# Patient Record
Sex: FEMALE | Race: WHITE | NOT HISPANIC OR LATINO | Employment: UNEMPLOYED | URBAN - METROPOLITAN AREA
[De-identification: names, ages, dates, MRNs, and addresses within clinical notes are randomized per-mention and may not be internally consistent; named-entity substitution may affect disease eponyms.]

---

## 2020-12-29 ENCOUNTER — OFFICE VISIT CONVERTED (OUTPATIENT)
Dept: GASTROENTEROLOGY | Facility: CLINIC | Age: 35
End: 2020-12-29
Attending: INTERNAL MEDICINE

## 2021-05-10 NOTE — H&P
History and Physical      Patient Name: Aye Glez   Patient ID: [id number]   Sex: Female   YOB: 1985        Visit Date: December 29, 2020    Provider: Torito Law MD   [facility]   Location Address: [address] [place] [address]  [id number]   Location Phone: [phone]          Chief Complaint  · Crohn's disease      History Of Present Illness  The patient is a 35 year old female, with a history of Crohn's disease referred by Marcus Garcia who comes in today for a new patient evaluation . She presents with abdominal pain. The pain is located diffusely in the abdomen. It is moderate in severity and sharp in nature. The pain has been present 1 year and is intermittent.   Crohn's History:    The patient was diagnosised with Crohn's disease approximately 3 months. At that time, the patient presented with a stricture. The stricture involved the terminal ileum. She was found to have Crohn's disease involving the terminal ileum. The patient's initial treatment included surgery. She underwent bowel resection and colon resection on 11/9/2020.   The patient has done well since her initial diagnosis.          The patient presented in early November to [facility] with acute abdominal pain and was found to have a mass of the ileum.  It appears that she went for exploratory laparotomy on November 9 with Dr. Garcia which resulted in open limited ileocecectomy with primary ileal cecal anastomosis and diverting distal ileostomy.  Surgical pathology of the ileum was consistent with Crohn's disease.  Patient presents now doing well with no ongoing abdominal.  She seems to be tolerate diverting ileostomy fairly well.  She no longer is requiring Imodium or Lomotil due to high ostomy output.  She has never had a colonoscopy.  She denies family history of inflammatory bowel disease.  She did stop smoking after her surgery.    Patient has had previous  "cholecystectomy.  Patient has 3 children ages 10 and 8 and 3       Past Medical History  Crohn's disease         Past Surgical History  EGD; Ileostomy; Tonsilectomy; Tubal ligation         Medication List  fiber oral powder         Allergy List  SULFA (SULFONAMIDES)       Allergies Reconciled  Family Medical History  - No Family History of Colorectal Cancer         Social History  Alcohol (Never); Tobacco (Former)         Review of Systems  · Eyes  o Denies  o : blurred vision, vision changes  · Cardiovascular  o Denies  o : exertional chest pain  · Respiratory  o Denies  o : shortness of breath  · Gastrointestinal  o Denies  o : nausea, vomiting  · Genitourinary  o Denies  o : dysuria, blood in urine  · Neurologic  o Denies  o : numbness or tingling  · Musculoskeletal  o Denies  o : joint pain  · Endocrine  o Denies  o : weight gain, weight loss  · Psychiatric  o Denies  o : anxiety, depression      Vitals  Date Time BP Position Site L\R Cuff Size HR RR TEMP (F) WT  HT  BMI kg/m2 BSA m2 O2 Sat FR L/min FiO2        12/29/2020 01:27 /78 Sitting    87 - R 12  216lbs 7oz 5'  5\" 36.02 2.12 100 %            Physical Examination  · Constitutional  o Appearance  o : Well-nourished, well developed, alert, in no acute distress, alert and oriented X 3.  · Eyes  o Vision  o :   § Visual Fields  § : eyes move symmetrical in all directions  o Sclerae  o : sclerae anicteric  o Pupils and Irises  o : pupils equal and symetrical  · Neck  o Inspection/Palpation  o : Trachea is midline, no adenopathy  o Thyroid  o : Thyroid is not enlarged  · Respiratory  o Respiratory Effort  o : Breathing is unlabored.  o Inspection of Chest  o : normal appearance, no retractions  o Auscultation of Lungs  o : Chest is clear to auscultation bilaterally.  · Cardiovascular  o Heart  o :   § Auscultation of Heart  § : no murmurs, rubs, or gallops  · Gastrointestinal  o Abdominal Examination  o : Abdomen is soft, nontender to palpation, with " normal active bowel sounds, no appreciable hepatosplenomegaly. Diverting ileostomy noted in the right lower quadrant. Midline incisional scar is well-healed.  · Genitourinary  o Digital Rectal Examination  o : Deferred  · Musculoskeletal  o Appearance  o : Gait is normal. There is no clubbing, cyanosis or edema.   · Skin and Subcutaneous Tissue  o General Inspection  o : Skin is without focal lesions. Skin turgor is normal.  · Psychiatric  o Mood and Affect  o : Mood and affect are appropriate to circumstances.          Assessment  · Crohn's Disease     555.2  · Ileostomy present     V44.2/Z93.2      Plan  · Medications  o Medications have been Reconciled  o Transition of Care or Provider Policy  · Instructions  o Overall the patient has improved since having a laparotomy with ileal resection, primary anastomosis and current diverting ileostomy performed in November 2020. We discussed the natural course of Crohn's disease and the importance of therapy to prevent recurrence. We will therefore begin azathioprine 50 mg daily and perform CBC, CMP, B12 folate, in 2 weeks and again in 6 weeks. She will call me in 4 weeks at which time if she is tolerating the medication well we will increase to 100 mg daily. Finally I will discuss her case with her surgeon Dr. Garcia to discuss timing of her ileostomy takedown in combination with her Crohn's treatment initiation. I would also likely pursue colonoscopy about a year after surgery to assess for mucosal recurrence of her Crohn's disease at the anastomosis.            Electronically Signed by: Torito Law MD -Author on December 29, 2020 02:23:48 PM

## 2021-05-14 VITALS
OXYGEN SATURATION: 100 % | RESPIRATION RATE: 12 BRPM | HEART RATE: 87 BPM | HEIGHT: 65 IN | SYSTOLIC BLOOD PRESSURE: 130 MMHG | WEIGHT: 216.44 LBS | BODY MASS INDEX: 36.06 KG/M2 | DIASTOLIC BLOOD PRESSURE: 78 MMHG

## 2021-07-26 RX ORDER — AZATHIOPRINE 50 MG/1
TABLET ORAL
Qty: 60 TABLET | Refills: 1 | Status: SHIPPED | OUTPATIENT
Start: 2021-07-26 | End: 2021-08-10 | Stop reason: SDUPTHER

## 2021-08-10 ENCOUNTER — OFFICE VISIT (OUTPATIENT)
Dept: GASTROENTEROLOGY | Facility: CLINIC | Age: 36
End: 2021-08-10

## 2021-08-10 VITALS
BODY MASS INDEX: 40.15 KG/M2 | OXYGEN SATURATION: 100 % | DIASTOLIC BLOOD PRESSURE: 79 MMHG | SYSTOLIC BLOOD PRESSURE: 128 MMHG | WEIGHT: 241 LBS | HEIGHT: 65 IN | HEART RATE: 94 BPM

## 2021-08-10 DIAGNOSIS — K50.00 CROHN'S DISEASE OF SMALL INTESTINE WITHOUT COMPLICATION (HCC): Primary | ICD-10-CM

## 2021-08-10 PROCEDURE — 99214 OFFICE O/P EST MOD 30 MIN: CPT | Performed by: INTERNAL MEDICINE

## 2021-08-10 RX ORDER — AZATHIOPRINE 50 MG/1
150 TABLET ORAL DAILY
Qty: 90 TABLET | Refills: 3 | Status: SHIPPED | OUTPATIENT
Start: 2021-08-10 | End: 2021-12-20

## 2021-08-10 RX ORDER — TOPIRAMATE 25 MG/1
25 TABLET ORAL 2 TIMES DAILY
COMMUNITY
End: 2022-01-11

## 2021-08-10 RX ORDER — OMEPRAZOLE 40 MG/1
CAPSULE, DELAYED RELEASE ORAL
COMMUNITY
End: 2022-01-11

## 2021-08-10 RX ORDER — PSYLLIUM HUSK (WITH SUGAR) 3 G/12 G
POWDER (GRAM) ORAL
COMMUNITY
End: 2022-01-11

## 2021-08-10 NOTE — PROGRESS NOTES
"Chief Complaint    Aye Glez is a 36 y.o. female who presents to [facility] for follow-up of Crohn's ileitis. Patient was diagnosed after having ileal surgery and resection of the terminal ileum in November 2020 per Dr. Garcia. She was diagnosed with Crohn's disease at that time. She stopped smoking after surgery. She had a diverting ileostomy and subsequent takedown of that ostomy in January 2021. Since that time she has done well and has been maintained on 100 mg of azathioprine. She currently denies any abdominal pain and has 2-3 bowel movements per day. She has however having more recent fatigue as well as generalized joint pain. She has also had prior cholecystectomy. She is somewhat sensitive to certain foods such as high-fat meals, steak and fried foods etc.    Result Review :     The following data was reviewed by: Torito Law MD on 08/10/2021:              Data reviewed: GI studies Ileal resection with diverting ileostomy in November 2020 with subsequent takedown and reanastomosis in January 2021     Past Medical History:   Diagnosis Date   • Anemia    • Crohn's disease (CMS/HCC)        Past Surgical History:   Procedure Laterality Date   • CHOLECYSTECTOMY     • COLON RESECTION  2021   • ENDOSCOPY  2020   • ILEOSTOMY  2020   • TONSILLECTOMY     • TUBAL ABDOMINAL LIGATION         Social History     Social History Narrative   • Not on file       Objective     Vital Signs:   /79   Pulse 94   Ht 165.1 cm (65\")   Wt 109 kg (241 lb)   SpO2 100%   BMI 40.10 kg/m²     Body mass index is 40.1 kg/m².    Physical Exam            Assessment and Plan    Diagnoses and all orders for this visit:    1. Crohn's disease of small intestine without complication (CMS/HCC) (Primary)  -     CBC (No Diff); Future  -     Vitamin D 25 Hydroxy; Future  -     Vitamin B12; Future  -     Comprehensive Metabolic Panel; Future  -     POC Erythrocyte Sed Rate, Non-automated  -  "    C-reactive Protein; Future  -     Folate; Future    The patient is having some increase of joint pain with diminished appetite and fatigue. She has been tolerating azathioprine 100 mg daily and her most recent labs in May of this year showed a hemoglobin of 12.5, normal LFTs, and WBCs of 8.3. I will therefore send her for the above labs to ensure she is not anemic again or needing B12 and/or iron replacement.. I will have her increase azathioprine to 150 mg daily as her weight today is 100 kg. She will then follow-up with me in January 2022 unless she has recurrent symptoms and we would plan colonoscopy at that time to assess for recurrence at her anastomosis.              Follow Up   Return in about 5 months (around 1/10/2022).  Patient was given instructions and counseling regarding her condition or for health maintenance advice. Please see specific information pulled into the AVS if appropriate.

## 2021-12-19 DIAGNOSIS — K50.00 CROHN'S DISEASE OF SMALL INTESTINE WITHOUT COMPLICATION (HCC): ICD-10-CM

## 2021-12-20 RX ORDER — AZATHIOPRINE 50 MG/1
TABLET ORAL
Qty: 90 TABLET | Refills: 3 | Status: SHIPPED | OUTPATIENT
Start: 2021-12-20 | End: 2022-08-23

## 2022-01-11 ENCOUNTER — OFFICE VISIT (OUTPATIENT)
Dept: GASTROENTEROLOGY | Facility: CLINIC | Age: 37
End: 2022-01-11

## 2022-01-11 ENCOUNTER — PREP FOR SURGERY (OUTPATIENT)
Dept: OTHER | Facility: HOSPITAL | Age: 37
End: 2022-01-11

## 2022-01-11 VITALS
BODY MASS INDEX: 43.87 KG/M2 | SYSTOLIC BLOOD PRESSURE: 139 MMHG | HEART RATE: 97 BPM | DIASTOLIC BLOOD PRESSURE: 92 MMHG | HEIGHT: 64 IN | WEIGHT: 257 LBS | OXYGEN SATURATION: 99 %

## 2022-01-11 DIAGNOSIS — R10.84 GENERALIZED ABDOMINAL PAIN: ICD-10-CM

## 2022-01-11 DIAGNOSIS — K50.00 CROHN'S DISEASE OF SMALL INTESTINE WITHOUT COMPLICATION: Primary | ICD-10-CM

## 2022-01-11 DIAGNOSIS — R19.7 DIARRHEA, UNSPECIFIED TYPE: ICD-10-CM

## 2022-01-11 PROCEDURE — 99214 OFFICE O/P EST MOD 30 MIN: CPT | Performed by: INTERNAL MEDICINE

## 2022-01-11 RX ORDER — ATENOLOL 25 MG/1
1 TABLET ORAL DAILY
COMMUNITY
End: 2022-01-11

## 2022-01-11 RX ORDER — LOPERAMIDE HYDROCHLORIDE 2 MG/1
2 CAPSULE ORAL 4 TIMES DAILY PRN
COMMUNITY

## 2022-01-11 RX ORDER — TRAZODONE HYDROCHLORIDE 50 MG/1
1 TABLET ORAL DAILY
COMMUNITY
End: 2022-01-11

## 2022-01-11 RX ORDER — CYANOCOBALAMIN 1000 UG/ML
1 INJECTION, SOLUTION INTRAMUSCULAR; SUBCUTANEOUS TAKE AS DIRECTED
COMMUNITY

## 2022-01-11 RX ORDER — HYDROCODONE BITARTRATE AND ACETAMINOPHEN 5; 325 MG/1; MG/1
1 TABLET ORAL TAKE AS DIRECTED
COMMUNITY
End: 2022-01-11

## 2022-01-11 RX ORDER — METRONIDAZOLE 500 MG/1
1 TABLET ORAL DAILY
COMMUNITY
End: 2022-01-11

## 2022-01-11 RX ORDER — MONTELUKAST SODIUM 4 MG/1
2 TABLET, CHEWABLE ORAL 2 TIMES DAILY
Qty: 120 TABLET | Refills: 5 | Status: SHIPPED | OUTPATIENT
Start: 2022-01-11 | End: 2022-07-06

## 2022-01-11 RX ORDER — FENOFIBRATE 54 MG/1
54 TABLET ORAL DAILY
COMMUNITY
End: 2022-01-11

## 2022-01-11 RX ORDER — NITROFURANTOIN 25; 75 MG/1; MG/1
1 CAPSULE ORAL DAILY
COMMUNITY
End: 2022-01-11

## 2022-01-11 RX ORDER — QUETIAPINE FUMARATE 25 MG/1
1 TABLET, FILM COATED ORAL EVERY 12 HOURS SCHEDULED
COMMUNITY
End: 2022-01-11

## 2022-01-11 RX ORDER — IBUPROFEN 600 MG/1
600 TABLET ORAL AS NEEDED
COMMUNITY

## 2022-01-11 RX ORDER — ZOLPIDEM TARTRATE 5 MG/1
5 TABLET ORAL NIGHTLY PRN
COMMUNITY

## 2022-01-11 RX ORDER — ALPRAZOLAM 0.5 MG/1
1 TABLET ORAL EVERY 8 HOURS SCHEDULED
COMMUNITY
End: 2022-01-11

## 2022-01-11 RX ORDER — CHLORAL HYDRATE 500 MG
1 CAPSULE ORAL DAILY
COMMUNITY
End: 2022-01-11

## 2022-01-11 RX ORDER — ONDANSETRON HYDROCHLORIDE 8 MG/1
1 TABLET, FILM COATED ORAL DAILY PRN
COMMUNITY

## 2022-01-11 RX ORDER — HYOSCYAMINE SULFATE 0.125 MG
0.12 TABLET ORAL EVERY 4 HOURS PRN
Qty: 120 TABLET | Refills: 5 | Status: SHIPPED | OUTPATIENT
Start: 2022-01-11 | End: 2022-02-10

## 2022-01-11 RX ORDER — BUSPIRONE HYDROCHLORIDE 10 MG/1
1 TABLET ORAL EVERY 12 HOURS SCHEDULED
COMMUNITY
End: 2022-01-11

## 2022-01-11 RX ORDER — DOXYCYCLINE HYCLATE 100 MG
100 TABLET ORAL DAILY
COMMUNITY
End: 2022-01-11

## 2022-01-11 RX ORDER — GLUCOSAMINE SULFATE 500 MG
2 CAPSULE ORAL DAILY
COMMUNITY
End: 2022-01-27

## 2022-01-11 RX ORDER — CYCLOBENZAPRINE HCL 10 MG
10 TABLET ORAL DAILY
COMMUNITY
End: 2022-01-11

## 2022-01-11 RX ORDER — CETIRIZINE HYDROCHLORIDE 10 MG/1
10 TABLET ORAL DAILY
COMMUNITY
Start: 2021-12-19 | End: 2023-02-02

## 2022-01-11 RX ORDER — SUCRALFATE 1 G/1
1 TABLET ORAL DAILY
COMMUNITY
End: 2022-01-11

## 2022-01-11 RX ORDER — MONTELUKAST SODIUM 10 MG/1
1 TABLET ORAL DAILY
COMMUNITY
Start: 2021-11-11

## 2022-01-11 RX ORDER — DULOXETIN HYDROCHLORIDE 60 MG/1
60 CAPSULE, DELAYED RELEASE ORAL DAILY
COMMUNITY
End: 2022-01-11

## 2022-01-11 RX ORDER — IBUPROFEN 800 MG/1
1 TABLET ORAL DAILY PRN
COMMUNITY
End: 2022-01-11

## 2022-01-11 RX ORDER — ECHINACEA PURPUREA AERIAL 350 MG
1 CAPSULE ORAL DAILY
COMMUNITY
End: 2022-01-27

## 2022-01-11 RX ORDER — LISINOPRIL 20 MG/1
20 TABLET ORAL DAILY
COMMUNITY
End: 2022-01-11

## 2022-01-11 RX ORDER — FLUTICASONE PROPIONATE 50 MCG
1 SPRAY, SUSPENSION (ML) NASAL DAILY PRN
COMMUNITY
End: 2022-01-11

## 2022-01-11 NOTE — PROGRESS NOTES
"Chief Complaint    Aye Glez is a 36 y.o. female who presents to [facility] for follow-up of Crohn's ileitis status post ileal resection in November 2020 at the time of her initial diagnosis.  Surgery was performed by Dr. Garcai in [place].  She had a temporary ileostomy with subsequent reversal in January 2021.  When I saw her soon thereafter she was started on azathioprine 50 mg which she has slowly increased to 150 mg now.  She continues to have frequent loose stools describing at least 8 loose stools per day.  She describes a generalized abdominal pain but also with some focal pain in the incisional site of the ileostomy scar.  She has frequent nausea but denies significant reflux symptoms.  She has used omeprazole in the past but feels it makes her symptoms worse.  She sometimes has nocturnal diarrhea.  She denies any rectal bleeding.  Her weight is stable.  She has not had colonoscopy since her reanastomosis.  Patient has also had prior cholecystectomy.  Labs reviewed from August showed a normal white count, normal LFTs, B12 was low and subsequently restarted on B12 injections.  Folate was normal.    Result Review :     The following data was reviewed by: Torito Law MD on 01/11/2022:              Data reviewed: GI studies No prior endoscopy     Past Medical History:   Diagnosis Date   • Anemia    • Crohn's disease (HCC)        Past Surgical History:   Procedure Laterality Date   • CHOLECYSTECTOMY     • COLON RESECTION  2021   • ENDOSCOPY  2020   • ILEOSTOMY  2020   • TONSILLECTOMY     • TUBAL ABDOMINAL LIGATION         Social History     Social History Narrative   • Not on file       Objective     Vital Signs:   /92   Pulse 97   Ht 162.6 cm (64\")   Wt 117 kg (257 lb)   SpO2 99%   BMI 44.11 kg/m²     Body mass index is 44.11 kg/m².    Physical Exam  Constitutional:       General: She is not in acute distress.     Appearance: Normal " appearance.   Eyes:      Visual Fields: Right eye visual fields normal and left eye visual fields normal.   Neck:      Trachea: Trachea normal.   Cardiovascular:      Rate and Rhythm: Normal rate and regular rhythm.      Heart sounds: Normal heart sounds.   Pulmonary:      Effort: Pulmonary effort is normal.      Breath sounds: Normal breath sounds and air entry.      Comments: Inspection of chest: normal appearance  Abdominal:      General: Bowel sounds are normal.      Palpations: Abdomen is soft. There is no mass.      Tenderness: There is no guarding.   Musculoskeletal:      Right lower leg: No edema.      Left lower leg: No edema.   Skin:     Findings: No lesion.      Comments: Turgor is normal   Neurological:      Mental Status: She is alert and oriented to person, place, and time.   Psychiatric:         Mood and Affect: Mood and affect normal.       Incision is well-healed, ileostomy scar site in the right lower quadrant is well-healed without abscess.  Focal tenderness is noted from the incision.          Assessment and Plan    Diagnoses and all orders for this visit:    1. Crohn's disease of small intestine without complication (HCC) (Primary)    2. Diarrhea, unspecified type    3. Generalized abdominal pain    Other orders  -     colestipol (Colestid) 1 g tablet; Take 2 tablets by mouth 2 (Two) Times a Day.  Dispense: 120 tablet; Refill: 5  -     hyoscyamine (ANASPAZ,LEVSIN) 0.125 MG tablet; Take 1 tablet by mouth Every 4 (Four) Hours As Needed for Cramping for up to 30 days.  Dispense: 120 tablet; Refill: 5    The patient has a new diagnosis of Crohn's ileitis status post ileal resection at the time of diagnosis in November 2020.  Temporary ileostomy was reversed in January 2021.  She has been maintained on azathioprine since that time, now at a dose of 150 mg daily.  Because she continues to have generalized abdominal pain, diarrhea, intermittent nausea, I will schedule the patient for both EGD and  colonoscopy.  In the meantime I will give her a trial of colestipol 2 g p.o. twice daily and Levsin 0.125 mg twice daily as tolerated for abdominal pain.  On the day of her exam I will send labs to include CBC and CMP.  Some of her nausea may be related to increasing dose of azathioprine and if she does not have active recurrent Crohn's disease this dose may need to be decreased.  Additionally if the patient has recurrent disease despite immunomodulators then the consideration of a biologic would be appropriate              Follow Up   No follow-ups on file.  Patient was given instructions and counseling regarding her condition or for health maintenance advice. Please see specific information pulled into the AVS if appropriate.

## 2022-01-26 ENCOUNTER — LAB (OUTPATIENT)
Dept: LAB | Facility: HOSPITAL | Age: 37
End: 2022-01-26

## 2022-01-26 DIAGNOSIS — K50.00 CROHN'S DISEASE OF SMALL INTESTINE WITHOUT COMPLICATION: ICD-10-CM

## 2022-01-26 PROCEDURE — U0004 COV-19 TEST NON-CDC HGH THRU: HCPCS

## 2022-01-28 LAB — SARS-COV-2 RNA PNL SPEC NAA+PROBE: NOT DETECTED

## 2022-01-31 ENCOUNTER — HOSPITAL ENCOUNTER (OUTPATIENT)
Facility: HOSPITAL | Age: 37
Setting detail: HOSPITAL OUTPATIENT SURGERY
Discharge: HOME | End: 2022-01-31
Attending: INTERNAL MEDICINE | Admitting: INTERNAL MEDICINE

## 2022-01-31 ENCOUNTER — ANESTHESIA EVENT (OUTPATIENT)
Dept: GASTROENTEROLOGY | Facility: HOSPITAL | Age: 37
End: 2022-01-31

## 2022-01-31 ENCOUNTER — ANESTHESIA (OUTPATIENT)
Dept: GASTROENTEROLOGY | Facility: HOSPITAL | Age: 37
End: 2022-01-31

## 2022-01-31 VITALS
HEART RATE: 63 BPM | OXYGEN SATURATION: 99 % | SYSTOLIC BLOOD PRESSURE: 120 MMHG | HEIGHT: 65 IN | TEMPERATURE: 97.5 F | DIASTOLIC BLOOD PRESSURE: 79 MMHG | WEIGHT: 261.47 LBS | BODY MASS INDEX: 43.56 KG/M2 | RESPIRATION RATE: 16 BRPM

## 2022-01-31 DIAGNOSIS — K50.00 CROHN'S DISEASE OF SMALL INTESTINE WITHOUT COMPLICATION: ICD-10-CM

## 2022-01-31 LAB
ALBUMIN SERPL-MCNC: 4.2 G/DL (ref 3.5–5.2)
ALBUMIN/GLOB SERPL: 1.2 G/DL
ALP SERPL-CCNC: 87 U/L (ref 39–117)
ALT SERPL W P-5'-P-CCNC: 24 U/L (ref 1–33)
ANION GAP SERPL CALCULATED.3IONS-SCNC: 10 MMOL/L (ref 5–15)
AST SERPL-CCNC: 42 U/L (ref 1–32)
BILIRUB SERPL-MCNC: 0.6 MG/DL (ref 0–1.2)
BUN SERPL-MCNC: 7 MG/DL (ref 6–20)
BUN/CREAT SERPL: 9.5 (ref 7–25)
CALCIUM SPEC-SCNC: 9.5 MG/DL (ref 8.6–10.5)
CHLORIDE SERPL-SCNC: 105 MMOL/L (ref 98–107)
CO2 SERPL-SCNC: 24 MMOL/L (ref 22–29)
CREAT SERPL-MCNC: 0.74 MG/DL (ref 0.57–1)
DEPRECATED RDW RBC AUTO: 43.4 FL (ref 37–54)
ERYTHROCYTE [DISTWIDTH] IN BLOOD BY AUTOMATED COUNT: 13.1 % (ref 12.3–15.4)
GFR SERPL CREATININE-BSD FRML MDRD: 90 ML/MIN/1.73
GLOBULIN UR ELPH-MCNC: 3.4 GM/DL
GLUCOSE SERPL-MCNC: 99 MG/DL (ref 65–99)
HAV IGM SERPL QL IA: NORMAL
HBV CORE IGM SERPL QL IA: NORMAL
HBV SURFACE AG SERPL QL IA: NORMAL
HCT VFR BLD AUTO: 36.6 % (ref 34–46.6)
HCV AB SER DONR QL: NORMAL
HGB BLD-MCNC: 13 G/DL (ref 12–15.9)
MCH RBC QN AUTO: 32.3 PG (ref 26.6–33)
MCHC RBC AUTO-ENTMCNC: 35.5 G/DL (ref 31.5–35.7)
MCV RBC AUTO: 90.8 FL (ref 79–97)
PLATELET # BLD AUTO: 270 10*3/MM3 (ref 140–450)
PMV BLD AUTO: 10.3 FL (ref 6–12)
POTASSIUM SERPL-SCNC: 4.2 MMOL/L (ref 3.5–5.2)
PROT SERPL-MCNC: 7.6 G/DL (ref 6–8.5)
RBC # BLD AUTO: 4.03 10*6/MM3 (ref 3.77–5.28)
SODIUM SERPL-SCNC: 139 MMOL/L (ref 136–145)
WBC NRBC COR # BLD: 6.49 10*3/MM3 (ref 3.4–10.8)

## 2022-01-31 PROCEDURE — 25010000002 PROPOFOL 10 MG/ML EMULSION: Performed by: NURSE ANESTHETIST, CERTIFIED REGISTERED

## 2022-01-31 PROCEDURE — 85027 COMPLETE CBC AUTOMATED: CPT | Performed by: INTERNAL MEDICINE

## 2022-01-31 PROCEDURE — 80074 ACUTE HEPATITIS PANEL: CPT | Performed by: INTERNAL MEDICINE

## 2022-01-31 PROCEDURE — 86480 TB TEST CELL IMMUN MEASURE: CPT | Performed by: INTERNAL MEDICINE

## 2022-01-31 PROCEDURE — 43239 EGD BIOPSY SINGLE/MULTIPLE: CPT | Performed by: INTERNAL MEDICINE

## 2022-01-31 PROCEDURE — 88305 TISSUE EXAM BY PATHOLOGIST: CPT | Performed by: INTERNAL MEDICINE

## 2022-01-31 PROCEDURE — 80053 COMPREHEN METABOLIC PANEL: CPT | Performed by: INTERNAL MEDICINE

## 2022-01-31 PROCEDURE — 45380 COLONOSCOPY AND BIOPSY: CPT | Performed by: INTERNAL MEDICINE

## 2022-01-31 RX ORDER — SODIUM CHLORIDE, SODIUM LACTATE, POTASSIUM CHLORIDE, CALCIUM CHLORIDE 600; 310; 30; 20 MG/100ML; MG/100ML; MG/100ML; MG/100ML
30 INJECTION, SOLUTION INTRAVENOUS CONTINUOUS
Status: DISCONTINUED | OUTPATIENT
Start: 2022-01-31 | End: 2022-01-31 | Stop reason: HOSPADM

## 2022-01-31 RX ORDER — LIDOCAINE HYDROCHLORIDE 20 MG/ML
INJECTION, SOLUTION INFILTRATION; PERINEURAL AS NEEDED
Status: DISCONTINUED | OUTPATIENT
Start: 2022-01-31 | End: 2022-01-31 | Stop reason: SURG

## 2022-01-31 RX ADMIN — SODIUM CHLORIDE, POTASSIUM CHLORIDE, SODIUM LACTATE AND CALCIUM CHLORIDE 30 ML/HR: 600; 310; 30; 20 INJECTION, SOLUTION INTRAVENOUS at 10:08

## 2022-01-31 RX ADMIN — PROPOFOL 175 MCG/KG/MIN: 10 INJECTION, EMULSION INTRAVENOUS at 10:23

## 2022-01-31 RX ADMIN — LIDOCAINE HYDROCHLORIDE 100 MG: 20 INJECTION, SOLUTION INFILTRATION; PERINEURAL at 10:23

## 2022-01-31 NOTE — ANESTHESIA POSTPROCEDURE EVALUATION
Patient: Aye Glez    Procedure Summary     Date: 01/31/22 Room / Location: [facility] ENDOSCOPY 2 / [facility] ENDOSCOPY    Anesthesia Start: 1020 Anesthesia Stop: 1045    Procedures:       ESOPHAGOGASTRODUODENOSCOPY WITH BX (N/A )      COLONOSCOPY WITH BX (N/A ) Diagnosis:       Crohn's disease of small intestine without complication (HCC)      (Crohn's disease of small intestine without complication (HCC) [K50.00])    Surgeons: Torito Law MD Provider: Niok Zamorano MD    Anesthesia Type: general ASA Status: 1          Anesthesia Type: general    Vitals  Vitals Value Taken Time   /79 01/31/22 1115   Temp 36.4 °C (97.5 °F) 01/31/22 1105   Pulse 63 01/31/22 1115   Resp 16 01/31/22 1110   SpO2 99 % 01/31/22 1115           Post Anesthesia Care and Evaluation    Patient location during evaluation: bedside  Patient participation: complete - patient participated  Level of consciousness: awake  Pain management: adequate  Airway patency: patent  Anesthetic complications: No anesthetic complications  PONV Status: none  Cardiovascular status: acceptable and stable  Respiratory status: acceptable  Hydration status: acceptable    Comments: An Anesthesiologist personally participated in the most demanding procedures (including induction and emergence if applicable) in the anesthesia plan, monitored the course of anesthesia administration at frequent intervals and remained physically present and available for immediate diagnosis and treatment of emergencies.

## 2022-01-31 NOTE — ANESTHESIA PREPROCEDURE EVALUATION
Anesthesia Evaluation     Patient summary reviewed and Nursing notes reviewed   no history of anesthetic complications:  NPO Solid Status: > 8 hours  NPO Liquid Status: > 2 hours           Airway   Mallampati: II  TM distance: >3 FB  Neck ROM: full  No difficulty expected  Dental      Pulmonary - negative pulmonary ROS and normal exam    breath sounds clear to auscultation  Cardiovascular - negative cardio ROS and normal exam  Exercise tolerance: good (4-7 METS)    Rhythm: regular  Rate: normal        Neuro/Psych- negative ROS  GI/Hepatic/Renal/Endo - negative ROS     Musculoskeletal (-) negative ROS    Abdominal    Substance History - negative use     OB/GYN negative ob/gyn ROS         Other - negative ROS                       Anesthesia Plan    ASA 1     general   (Total IV Anesthesia    Patient understands anesthesia not responsible for dental damage.  )  intravenous induction     Anesthetic plan, all risks, benefits, and alternatives have been provided, discussed and informed consent has been obtained with: patient.    Plan discussed with CRNA.        CODE STATUS:

## 2022-02-01 LAB
CYTO UR: NORMAL
LAB AP CASE REPORT: NORMAL
LAB AP CLINICAL INFORMATION: NORMAL
PATH REPORT.FINAL DX SPEC: NORMAL
PATH REPORT.GROSS SPEC: NORMAL

## 2022-02-02 LAB
GAMMA INTERFERON BACKGROUND BLD IA-ACNC: 0.04 IU/ML
M TB IFN-G BLD-IMP: NEGATIVE
M TB IFN-G CD4+ BCKGRND COR BLD-ACNC: 0.08 IU/ML
M TB IFN-G CD4+CD8+ BCKGRND COR BLD-ACNC: 0.04 IU/ML
MITOGEN IGNF BLD-ACNC: >10 IU/ML
QUANTIFERON INCUBATION: NORMAL
SERVICE CMNT-IMP: NORMAL

## 2022-02-25 ENCOUNTER — TELEPHONE (OUTPATIENT)
Dept: GASTROENTEROLOGY | Facility: CLINIC | Age: 37
End: 2022-02-25

## 2022-02-25 RX ORDER — MESALAMINE 500 MG/1
1000 CAPSULE, EXTENDED RELEASE ORAL 3 TIMES DAILY
Qty: 180 CAPSULE | Refills: 5 | Status: SHIPPED | OUTPATIENT
Start: 2022-02-25 | End: 2022-04-22

## 2022-02-25 NOTE — TELEPHONE ENCOUNTER
Ms Glez ins denied Humira that dr law had sent in after her colonoscopy. Per her ins, she has to try formulary meds first...  Per Dr Law, send in Pentasa 500mg 2po TID #180 5 refills. Set up Peer to Peer once he returns to office.    I informed Ms Glez of this, she voiced understanding. ayo

## 2022-04-20 NOTE — PROGRESS NOTES
Chief Complaint   6 week colon and egd F/U    History of Present Illness       Aye Glez is a 37 y.o. female who presents to [facility]STR[facility]OLOG[facility] for after recent EGD and colonoscopy we have reviewed endoscopy and pathology at this time.  Follow-up with a history of Crohn's ileitis status post ileal resection in November 2020 at the time of her initial diagnosis.  Surgery was performed by Dr. Garcia in [place].  She had a temporary ileostomy with subsequent reversal in January 2021.  Patient continues on Pentasa 500 mg 2 capsules 3 times a day.  At the last office visit patient was given colestipol 2 g p.o. twice daily and Levsin to be used as needed for abdominal pain.  Patient reports she is still having 6-10 loose stools per day that the colestipol does help to provide some relief.  She has pain in the right abdomen.  She is seeing some bleeding last was on Monday.  She is experiencing gas, bloating and weight gain of 20 pounds.  She has intermittent nausea.  Patient denies fever, vomiting, weight loss, night sweats, melena, hematemesis.    Endoscopy: Review of the patient's most recent EGD and colonoscopy performed by Dr. Law on 01.31.2022 patent and then ileocolonic anastomosis characterized by ulceration normal mucosa in the entire colon.  Plan to begin Humira.       Results       Result Review :       CMP    CMP 1/31/22   Glucose 99   BUN 7   Creatinine 0.74   eGFR Non African Am 90   Sodium 139   Potassium 4.2   Chloride 105   Calcium 9.5   Albumin 4.20   Total Bilirubin 0.6   Alkaline Phosphatase 87   AST (SGOT) 42 (A)   ALT (SGPT) 24   (A) Abnormal value            CBC    CBC 1/31/22   WBC 6.49   RBC 4.03   Hemoglobin 13.0   Hematocrit 36.6   MCV 90.8   MCH 32.3   MCHC 35.5   RDW 13.1   Platelets 270                         Past Medical History       Past Medical History:   Diagnosis Date   • Anemia    • Crohn's disease (HCC)        Past Surgical History:    Procedure Laterality Date   • CHOLECYSTECTOMY     • COLON RESECTION  2021   • COLONOSCOPY N/A 01/31/2022    Procedure: COLONOSCOPY WITH BX;  Surgeon: Torito Law MD;  Location: [facility] ENDOSCOPY;  Service: Gastroenterology;  Laterality: N/A;  ULCERS AT ANASTOMOSIS SITE, PRIOR SURGERY RIGHT COLON    • ENDOSCOPY  2020   • ENDOSCOPY N/A 01/31/2022    Procedure: ESOPHAGOGASTRODUODENOSCOPY WITH BX;  Surgeon: Torito Law MD;  Location: [facility] ENDOSCOPY;  Service: Gastroenterology;  Laterality: N/A;  NORMAL EGD   • ILEOSTOMY  2020   • ILEOSTOMY REVISION  2021   • TONSILLECTOMY     • TUBAL ABDOMINAL LIGATION     • UPPER GASTROINTESTINAL ENDOSCOPY           Current Outpatient Medications:   •  cetirizine (zyrTEC) 10 MG tablet, Take 10 mg by mouth Daily., Disp: , Rfl:   •  cholecalciferol (VITAMIN D3) 1.25 MG (04825 UT) capsule, Take 1 capsule by mouth Every 7 (Seven) Days., Disp: , Rfl:   •  colestipol (Colestid) 1 g tablet, Take 2 tablets by mouth 2 (Two) Times a Day., Disp: 120 tablet, Rfl: 5  •  cyanocobalamin 1000 MCG/ML injection, Inject 1 mcg into the appropriate muscle as directed by prescriber Take As Directed., Disp: , Rfl:   •  hyoscyamine (LEVSIN) 0.125 MG SL tablet, , Disp: , Rfl:   •  ibuprofen (ADVIL,MOTRIN) 600 MG tablet, Take 600 mg by mouth As Needed for Mild Pain ., Disp: , Rfl:   •  loperamide (IMODIUM) 2 MG capsule, Take 2 mg by mouth 4 (Four) Times a Day As Needed for Diarrhea., Disp: , Rfl:   •  mesalamine (PENTASA) 500 MG CR capsule, Take 2 capsules by mouth 3 (Three) Times a Day for 56 days., Disp: 180 capsule, Rfl: 5  •  montelukast (SINGULAIR) 10 MG tablet, Take 1 tablet by mouth Daily., Disp: , Rfl:   •  ondansetron (ZOFRAN) 8 MG tablet, Take 1 tablet by mouth Daily As Needed., Disp: , Rfl:   •  zolpidem (AMBIEN) 5 MG tablet, Take 5 mg by mouth At Night As Needed for Sleep., Disp: , Rfl:   •  azaTHIOprine (IMURAN) 50 MG tablet, TAKE THREE TABLETS BY MOUTH DAILY (Patient  "taking differently: 150 mg Daily. Take 3 tablets (150mg) daily per patient.), Disp: 90 tablet, Rfl: 3  •  Fenugreek 610 MG capsule, Take 610 mg by mouth Daily As Needed., Disp: , Rfl:   •  predniSONE (DELTASONE) 10 MG tablet, Take 4 tablets by mouth daily for 1 weeks, 3 tablets by mouth daily for 1 weeks, 2 tablets by mouth daily for 1 weeks, 1 tablet by mouth daily for 1 weeks., Disp: 100 tablet, Rfl: 0     Allergies   Allergen Reactions   • Iodine GI Intolerance   • Sulfa Antibiotics Hives       Family History   Problem Relation Age of Onset   • Diabetes Mother    • Heart attack Sister 25        PASSED AWAY   • Asthma Brother    • Diabetes Maternal Grandfather    • Hearing loss Maternal Grandfather    • Colon cancer Neg Hx    • Malig Hyperthermia Neg Hx         Social History     Social History Narrative   • Not on file       Objective     Vital Signs:   /78 (BP Location: Left arm, Patient Position: Sitting, Cuff Size: Large Adult)   Pulse 68   Ht 165.1 cm (65\")   Wt 121 kg (266 lb 14.4 oz)   SpO2 99%   BMI 44.41 kg/m²       Physical Exam  Constitutional:       General: She is not in acute distress.     Appearance: Normal appearance. She is well-developed. She is obese.   Eyes:      Conjunctiva/sclera: Conjunctivae normal.      Pupils: Pupils are equal, round, and reactive to light.      Visual Fields: Right eye visual fields normal and left eye visual fields normal.   Cardiovascular:      Rate and Rhythm: Normal rate and regular rhythm.      Heart sounds: Normal heart sounds.   Pulmonary:      Effort: Pulmonary effort is normal. No retractions.      Breath sounds: Normal breath sounds and air entry.      Comments: Inspection of chest: normal appearance  Abdominal:      General: Bowel sounds are normal.      Palpations: Abdomen is soft.      Tenderness: There is no abdominal tenderness.      Comments: No appreciable hepatosplenomegaly   Musculoskeletal:      Cervical back: Neck supple.      Right lower " leg: No edema.      Left lower leg: No edema.   Lymphadenopathy:      Cervical: No cervical adenopathy.   Skin:     Findings: No lesion.      Comments: Turgor normal   Neurological:      Mental Status: She is alert and oriented to person, place, and time.   Psychiatric:         Mood and Affect: Mood and affect normal.           Assessment & Plan          Assessment and Plan    Diagnoses and all orders for this visit:    1. Crohn's disease of small intestine with rectal bleeding (HCC) (Primary)  -     Hepatitis Panel, Acute; Future  -     QuantiFERON TB Gold; Future    2. Diarrhea, unspecified type  -     Hepatitis Panel, Acute; Future  -     QuantiFERON TB Gold; Future    3. Generalized abdominal pain  -     Hepatitis Panel, Acute; Future  -     QuantiFERON TB Gold; Future    4. Medication management  -     Hepatitis Panel, Acute; Future  -     QuantiFERON TB Gold; Future    5. Altered bowel habits    6. Rectal bleeding    7. Nausea    Other orders  -     predniSONE (DELTASONE) 10 MG tablet; Take 4 tablets by mouth daily for 1 weeks, 3 tablets by mouth daily for 1 weeks, 2 tablets by mouth daily for 1 weeks, 1 tablet by mouth daily for 1 weeks.  Dispense: 100 tablet; Refill: 0        37-year-old female presenting the office today for follow-up after recent EGD and colonoscopy. We have reviewed endoscopy and pathology at this time.  Follow-up with a history of Crohn's ileitis status post ileal resection in November 2020 at the time of her initial diagnosis.  Surgery was performed by Dr. Garcia in [place].  She had a temporary ileostomy with subsequent reversal in January 2021.  Patient continues on Pentasa 500 mg 2 capsules 3 times a day.  At the last office visit patient was given colestipol 2 g p.o. twice daily and Levsin to be used as needed for abdominal pain.  Patient reports she is still having 6-10 loose stools per day that the colestipol does help to provide some relief.  She has pain in the right  abdomen.  She is seeing some bleeding last was on Monday.  With the patient symptoms have discussed with Dr. Law we will progress with Remicade infusions and a steroid taper for 1 month.  Patient is agreeable to this plan and will follow up in the office in 3 months.        Follow Up       Follow Up   Return in about 3 months (around 7/21/2022).  Patient was given instructions and counseling regarding her condition or for health maintenance advice. Please see specific information pulled into the AVS if appropriate.

## 2022-04-21 ENCOUNTER — OFFICE VISIT (OUTPATIENT)
Dept: GASTROENTEROLOGY | Facility: CLINIC | Age: 37
End: 2022-04-21

## 2022-04-21 VITALS
SYSTOLIC BLOOD PRESSURE: 126 MMHG | WEIGHT: 266.9 LBS | BODY MASS INDEX: 44.47 KG/M2 | OXYGEN SATURATION: 99 % | HEIGHT: 65 IN | HEART RATE: 68 BPM | DIASTOLIC BLOOD PRESSURE: 78 MMHG

## 2022-04-21 DIAGNOSIS — K62.5 RECTAL BLEEDING: ICD-10-CM

## 2022-04-21 DIAGNOSIS — K50.011 CROHN'S DISEASE OF SMALL INTESTINE WITH RECTAL BLEEDING: Primary | ICD-10-CM

## 2022-04-21 DIAGNOSIS — R19.4 ALTERED BOWEL HABITS: ICD-10-CM

## 2022-04-21 DIAGNOSIS — Z79.899 MEDICATION MANAGEMENT: ICD-10-CM

## 2022-04-21 DIAGNOSIS — R19.7 DIARRHEA, UNSPECIFIED TYPE: ICD-10-CM

## 2022-04-21 DIAGNOSIS — R10.84 GENERALIZED ABDOMINAL PAIN: ICD-10-CM

## 2022-04-21 DIAGNOSIS — R11.0 NAUSEA: ICD-10-CM

## 2022-04-21 PROCEDURE — 99214 OFFICE O/P EST MOD 30 MIN: CPT | Performed by: NURSE PRACTITIONER

## 2022-04-21 RX ORDER — PREDNISONE 10 MG/1
TABLET ORAL
Qty: 100 TABLET | Refills: 0 | Status: SHIPPED | OUTPATIENT
Start: 2022-04-21 | End: 2022-08-23

## 2022-04-21 NOTE — PATIENT INSTRUCTIONS
Crohn's Disease    Crohn's disease is a long-lasting (chronic) disease that affects the gastrointestinal (GI) tract. Crohn's disease often causes irritation and inflammation in the small intestine and the beginning of the large intestine, but it can affect any part of the GI tract. Crohn's disease is part of a group of illnesses that are known as inflammatory bowel disease (IBD).  Crohn's disease may start slowly and get worse over time. Symptoms may come and go. They may also go away for months or even years at a time (remission).  What are the causes?  The exact cause of this condition is not known. It may involve a response that causes your body's disease-fighting (immune) system to attack healthy cells and tissues (autoimmune response). Bacteria, genes, and your environment may also play a role.  What increases the risk?  The following factors may make you more likely to develop this condition:  Having a family member who has Crohn's disease, another IBD, or an autoimmune condition.  Using products that contain nicotine or tobacco, such as cigarettes and e-cigarettes.  Being in your 20s.  Having Eastern  ancestry.  What are the signs or symptoms?  The main symptoms of this condition involve your GI tract. These include:  Diarrhea.  Pain or cramping in the abdomen commonly felt in the lower right side of the abdomen.  Frequent watery or bloody stools.  Constipation. This may mean having:  Fewer bowel movements in a week than normal.  Difficulty having a bowel movement.  Stools that are dry, hard, or larger than normal.  Rectal bleeding.  Rectal pain.  An urgent need to have a bowel movement.  The feeling that you are not finished having a bowel movement.  Other symptoms may include:  Unexplained weight loss.  Fatigue.  Fever.  Nausea or appetite loss.  Joint pain.  Vision changes.  Red bumps or sores on the skin.  Sores inside the mouth.  How is this diagnosed?  This condition may be diagnosed based  on:  Your symptoms and medical history.  A physical exam.  Tests, which may include:  Blood tests.  Stool sample tests.  Imaging tests, such as X-rays and CT scans.  Tests to examine the inside of your intestines using a long, flexible tube that has a light and a camera on the end (colonoscopy).  A procedure to remove tissue samples from inside your bowel for testing (biopsy).  You may need to work with a health care provider who specializes in diseases of the digestive tract (gastroenterologist).  How is this treated?  There is no cure for this condition, and it affects each person differently. Treatment can help you manage your symptoms. Your treatment may include:  Resting your bowels. This involves having a period of healing time when your bowels are not passing stools. This may be done by:  Drinking only clear liquids. These are liquids that you can see through, such as water, black coffee, fruit juice without pulp, broth, gelatin, and ice pops.  Getting nutrition through an IV for a period of time.  Medicines. These may be used by themselves or with other treatments (combination therapy). You may be given medicines that help to:  Reduce inflammation.  Control your immune system activity.  Fight infections.  Relieve cramps and prevent diarrhea.  Control your pain.  Surgery. You may need surgery if:  Medicines and other treatments are not working anymore.  You develop complications from severe Crohn's disease.  A section of your intestine becomes so damaged that it needs to be removed.  Follow these instructions at home:  Medicines  Take over-the-counter and prescription medicines only as told by your health care provider.  If you were prescribed an antibiotic, take it as told by your health care provider. Do not stop taking the antibiotic even if you start to feel better.  Avoid taking ibuprofen or other NSAID medicines if possible, these can make Crohn's disease worse.  Eating and drinking  Talk with your  health care provider or a diet and nutrition specialist (registered dietitian) about what diet is best for you.  Drink enough fluid to keep your urine pale yellow.  If you are taking steroids to reduce inflammation, get plenty of calcium in your diet to help keep your bones healthy. You may also consider taking a calcium supplement with vitamin D.  Keep a food diary to identify foods that make your symptoms better or worse, and avoid foods that cause symptoms.  Follow instructions from your health care provider about eating or drinking restrictions if you have worsening symptoms (flare-up).  Limit alcohol intake to no more than 1 drink a day for nonpregnant women and 2 drinks a day for men. One drink equals 12 oz of beer, 5 oz of wine, or 1½ oz of hard liquor.  General instructions  Make sure you get all the vaccines that your health care provider recommends, especially pneumonia (pneumococcal) and flu (influenza) vaccines.  Do not use any products that contain nicotine or tobacco, such as cigarettes and e-cigarettes. If you need help quitting, ask your health care provider.  Exercise every day, or as often told by your health care provider.  Keep all follow-up visits as told by your health care provider. This is important.  Contact a health care provider if:  You have diarrhea, cramps in your abdomen, and other GI problems that are present almost all the time.  Your symptoms do not improve with treatment.  You continue to lose weight.  You develop a rash or sores on your skin.  You develop eye problems.  You have a fever.  Your symptoms get worse or you develop new symptoms.  Get help right away if:  You have bloody diarrhea.  You have severe pain in your abdomen.  You cannot pass stools.  Summary  Crohn's disease affects each person differently. The cause of this condition is not known, but it may involve a response that causes your body's immune system to attack healthy cells and tissues.  There are multiple  treatment options that can help you manage the condition. Talk with your health care provider or diet and nutrition specialist (registered dietitian) about what diet is best for you.  Make sure you get all the vaccines that your health care provider recommends, especially pneumonia (pneumococcal) and flu (influenza) vaccines.  This information is not intended to replace advice given to you by your health care provider. Make sure you discuss any questions you have with your health care provider.  Document Revised: 08/19/2021 Document Reviewed: 08/19/2021  [employer] Patient Education © 2021 [employer]

## 2022-05-03 ENCOUNTER — TELEPHONE (OUTPATIENT)
Dept: GASTROENTEROLOGY | Facility: CLINIC | Age: 37
End: 2022-05-03

## 2022-05-03 NOTE — TELEPHONE ENCOUNTER
I left patient a voicemail reminding them to have labs performed at any [facility] facility at their earliest convenience. I left a call back number if there are any questions.

## 2022-05-03 NOTE — TELEPHONE ENCOUNTER
I spoke with pt. The lab orders that are in Epic are duplicates and do not need to be completed. Patient aware.

## 2022-05-05 ENCOUNTER — TELEPHONE (OUTPATIENT)
Dept: GASTROENTEROLOGY | Facility: CLINIC | Age: 37
End: 2022-05-05

## 2022-05-05 NOTE — TELEPHONE ENCOUNTER
Ms Glez left  asking if she is to continue Pentasa med, since she starts Remicade infusion. Her 1st infusion is scheduled 05/10/2022..    Per Dr Law, she to continue pentasa.    I left  to call back. ayo

## 2022-05-10 NOTE — TELEPHONE ENCOUNTER
I spoke with Ms Glez, she was scheduled for today for her 1st infusion. It had to be rescheduled to next week.  Axel

## 2022-05-17 ENCOUNTER — TELEPHONE (OUTPATIENT)
Dept: GASTROENTEROLOGY | Facility: CLINIC | Age: 37
End: 2022-05-17

## 2022-05-17 NOTE — TELEPHONE ENCOUNTER
Aranza with [facility] called to inform the office that she found a possible drug interaction with Remicade and Azathioprine.  Please contact her at [phone]

## 2022-05-18 NOTE — TELEPHONE ENCOUNTER
I called below phone  number, Aranza was not in yet. I informed Alejo at pharmacy. He voiced understanding. Stated the infusion has already been administered to Ms Glez.  Axel

## 2022-05-23 ENCOUNTER — PATIENT MESSAGE (OUTPATIENT)
Dept: GASTROENTEROLOGY | Facility: CLINIC | Age: 37
End: 2022-05-23

## 2022-05-23 ENCOUNTER — TELEPHONE (OUTPATIENT)
Dept: GASTROENTEROLOGY | Facility: CLINIC | Age: 37
End: 2022-05-23

## 2022-05-23 DIAGNOSIS — K50.00 CROHN'S DISEASE OF SMALL INTESTINE WITHOUT COMPLICATION: Primary | ICD-10-CM

## 2022-05-23 RX ORDER — MESALAMINE 500 MG/1
CAPSULE, EXTENDED RELEASE ORAL
Qty: 180 CAPSULE | Refills: 5 | Status: SHIPPED | OUTPATIENT
Start: 2022-05-23 | End: 2023-02-02

## 2022-05-23 NOTE — TELEPHONE ENCOUNTER
Ms Glez called stating she was using [facility], but now using [facility]. During the pharm to pharm transfer, her Pentasa 500mg Rx got mixed up.    She takes Pentasa 500mg 2TID (see Bharati Wheeler,NP note). [facility] has it listed as 2 BID..    She is also asking for a Note for work for frequent bathroom breaks due to her IBD.     Axel

## 2022-06-14 RX ORDER — HYDROCORTISONE ACETATE PRAMOXINE HCL 2.5; 1 G/100G; G/100G
CREAM TOPICAL 3 TIMES DAILY
Qty: 4 G | Refills: 1 | Status: SHIPPED | OUTPATIENT
Start: 2022-06-14

## 2022-07-06 RX ORDER — MONTELUKAST SODIUM 4 MG/1
TABLET, CHEWABLE ORAL
Qty: 120 TABLET | Refills: 3 | Status: SHIPPED | OUTPATIENT
Start: 2022-07-06 | End: 2022-10-25 | Stop reason: SDUPTHER

## 2022-08-19 NOTE — PROGRESS NOTES
Chief Complaint   Crohn's disease     History of Present Illness       Aye Glez is a 37 y.o. female who presents to [facility] for Follow-up with a history of Crohn's ileitis status post ileal resection in November 2020 at the time of her initial diagnosis.  Surgery was performed by Dr. Garcia in [place].  She had a temporary ileostomy with subsequent reversal in January 2021.  At the patient's last office visit she was continued to have 6-10 loose bowel movements per day with use of Pentasa and colestipol so we transition to Remicade infusions every 8 weeks and a steroid taper for 1 month.  Patient's last infusion was July 5 and her next infusion is scheduled for next week.  Today the patient reports improved bowel movements with only 2 formed stools per day without melena or hematochezia.  She is experiencing right lower quadrant pain that is currently is once a day lasting anywhere from 20 to 30 minutes ranging from 4-10 on the pain scale that has been present for the past 2 to 4 weeks.  Patient reports some nausea with the right lower quadrant pain.  She continues on colestipol 2 g daily, Remicade and Pentasa 2 tablets 3 times a day.  Patient wishes to come off of the Pentasa if she is able to.  Patient denies fever,vomiting, weight loss, night sweats, melena, hematochezia, hematemesis.    Endoscopy: Review of the patient's most recent EGD and colonoscopy performed by Dr. Law on 01.31.2022 patent and then ileocolonic anastomosis characterized by ulceration normal mucosa in the entire colon.  Plan to begin Humira.     Labs performed on 01/31/2022 see below       Results       Result Review :       CMP    CMP 1/31/22   Glucose 99   BUN 7   Creatinine 0.74   eGFR Non African Am 90   Sodium 139   Potassium 4.2   Chloride 105   Calcium 9.5   Albumin 4.20   Total Bilirubin 0.6   Alkaline Phosphatase 87   AST (SGOT) 42 (A)   ALT (SGPT) 24   (A) Abnormal value             CBC    CBC 1/31/22   WBC 6.49   RBC 4.03   Hemoglobin 13.0   Hematocrit 36.6   MCV 90.8   MCH 32.3   MCHC 35.5   RDW 13.1   Platelets 270                         Past Medical History       Past Medical History:   Diagnosis Date   • Anemia    • Crohn's disease (HCC)        Past Surgical History:   Procedure Laterality Date   • CHOLECYSTECTOMY     • COLON RESECTION  2021   • COLONOSCOPY N/A 01/31/2022    Procedure: COLONOSCOPY WITH BX;  Surgeon: Torito Law MD;  Location: [facility] ENDOSCOPY;  Service: Gastroenterology;  Laterality: N/A;  ULCERS AT ANASTOMOSIS SITE, PRIOR SURGERY RIGHT COLON    • ENDOSCOPY  2020   • ENDOSCOPY N/A 01/31/2022    Procedure: ESOPHAGOGASTRODUODENOSCOPY WITH BX;  Surgeon: Torito Law MD;  Location: [facility] ENDOSCOPY;  Service: Gastroenterology;  Laterality: N/A;  NORMAL EGD   • ILEOSTOMY  2020   • ILEOSTOMY REVISION  2021   • TONSILLECTOMY     • TUBAL ABDOMINAL LIGATION     • UPPER GASTROINTESTINAL ENDOSCOPY           Current Outpatient Medications:   •  cetirizine (zyrTEC) 10 MG tablet, Take 10 mg by mouth Daily., Disp: , Rfl:   •  cholecalciferol (VITAMIN D3) 1.25 MG (08728 UT) capsule, Take 1 capsule by mouth Every 7 (Seven) Days., Disp: , Rfl:   •  colestipol (COLESTID) 1 g tablet, Take 2 tablets by mouth twice daily, Disp: 120 tablet, Rfl: 3  •  cyanocobalamin 1000 MCG/ML injection, Inject 1 mcg into the appropriate muscle as directed by prescriber Take As Directed., Disp: , Rfl:   •  Hydrocort-Pramoxine, Perianal, (ANALPRAM-HC) 2.5-1 % rectal cream, Insert  into the rectum 3 (Three) Times a Day. Apply 1 inch TID for 7 days, Disp: 4 g, Rfl: 1  •  hyoscyamine (LEVSIN) 0.125 MG SL tablet, TAKE ONE TABLET BY MOUTH EVERY 4 HOURS AS NEEDED FOR CRAMPING FOR UP TO 30 DAYS., Disp: 120 tablet, Rfl: 1  •  ibuprofen (ADVIL,MOTRIN) 600 MG tablet, Take 600 mg by mouth As Needed for Mild Pain ., Disp: , Rfl:   •  inFLIXimab (REMICADE IV), Infuse  into a venous catheter.,  "Disp: , Rfl:   •  loperamide (IMODIUM) 2 MG capsule, Take 2 mg by mouth 4 (Four) Times a Day As Needed for Diarrhea., Disp: , Rfl:   •  mesalamine (PENTASA) 500 MG CR capsule, Take 2 capsules three times a day, Disp: 180 capsule, Rfl: 5  •  montelukast (SINGULAIR) 10 MG tablet, Take 1 tablet by mouth Daily., Disp: , Rfl:   •  ondansetron (ZOFRAN) 8 MG tablet, Take 1 tablet by mouth Daily As Needed., Disp: , Rfl:   •  phentermine 37.5 MG capsule, Take 37.5 mg by mouth Every Morning., Disp: , Rfl:   •  zolpidem (AMBIEN) 5 MG tablet, Take 5 mg by mouth At Night As Needed for Sleep., Disp: , Rfl:   •  Fenugreek 610 MG capsule, Take 610 mg by mouth Daily As Needed., Disp: , Rfl:      Allergies   Allergen Reactions   • Iodine GI Intolerance   • Sulfa Antibiotics Hives       Family History   Problem Relation Age of Onset   • Diabetes Mother    • Heart attack Sister 25        PASSED AWAY   • Asthma Brother    • Diabetes Maternal Grandfather    • Hearing loss Maternal Grandfather    • Colon cancer Neg Hx    • Malig Hyperthermia Neg Hx         Social History     Social History Narrative   • Not on file       Objective   Vital Signs:   /81 (BP Location: Right arm, Patient Position: Sitting, Cuff Size: Large Adult)   Pulse 90   Ht 165.1 cm (65\")   Wt 122 kg (268 lb 9.6 oz)   SpO2 99%   BMI 44.70 kg/m²       Physical Exam  Constitutional:       General: She is not in acute distress.     Appearance: Normal appearance. She is well-developed and normal weight.   Eyes:      Conjunctiva/sclera: Conjunctivae normal.      Pupils: Pupils are equal, round, and reactive to light.      Visual Fields: Right eye visual fields normal and left eye visual fields normal.   Cardiovascular:      Rate and Rhythm: Normal rate and regular rhythm.      Heart sounds: Normal heart sounds.   Pulmonary:      Effort: Pulmonary effort is normal. No retractions.      Breath sounds: Normal breath sounds and air entry.      Comments: Inspection of " chest: normal appearance  Abdominal:      General: Bowel sounds are normal.      Palpations: Abdomen is soft.      Tenderness: There is no abdominal tenderness.      Comments: No appreciable hepatosplenomegaly   Musculoskeletal:      Cervical back: Neck supple.      Right lower leg: No edema.      Left lower leg: No edema.   Lymphadenopathy:      Cervical: No cervical adenopathy.   Skin:     Findings: No lesion.      Comments: Turgor normal   Neurological:      Mental Status: She is alert and oriented to person, place, and time.   Psychiatric:         Mood and Affect: Mood and affect normal.           Assessment & Plan          Assessment and Plan    Diagnoses and all orders for this visit:    1. Crohn's disease of small intestine without complication (HCC) (Primary)  -     Cancel: CBC & Differential  -     Cancel: Comprehensive Metabolic Panel; Future  -     Cancel: Vitamin B12 & Folate  -     Cancel: Iron Profile  -     CT Abdomen Pelvis Without Contrast; Future  -     Comprehensive Metabolic Panel; Future  -     CBC & Differential; Future  -     Iron Profile; Future  -     Vitamin B12 & Folate; Future    2. Generalized abdominal pain  -     Cancel: CBC & Differential  -     Cancel: Comprehensive Metabolic Panel; Future  -     Cancel: Vitamin B12 & Folate  -     Cancel: Iron Profile  -     CT Abdomen Pelvis Without Contrast; Future  -     Comprehensive Metabolic Panel; Future  -     CBC & Differential; Future  -     Iron Profile; Future  -     Vitamin B12 & Folate; Future    3. Medication management  -     Cancel: CBC & Differential  -     Cancel: Comprehensive Metabolic Panel; Future  -     Cancel: Vitamin B12 & Folate  -     Cancel: Iron Profile  -     CT Abdomen Pelvis Without Contrast; Future  -     Comprehensive Metabolic Panel; Future  -     CBC & Differential; Future  -     Iron Profile; Future  -     Vitamin B12 & Folate; Future    4. Altered bowel habits  -     Cancel: CBC & Differential  -     Cancel:  Comprehensive Metabolic Panel; Future  -     Cancel: Vitamin B12 & Folate  -     Cancel: Iron Profile  -     CT Abdomen Pelvis Without Contrast; Future  -     Comprehensive Metabolic Panel; Future  -     CBC & Differential; Future  -     Iron Profile; Future  -     Vitamin B12 & Folate; Future    5. Right lower quadrant abdominal pain  -     Cancel: CBC & Differential  -     Cancel: Comprehensive Metabolic Panel; Future  -     Cancel: Vitamin B12 & Folate  -     Cancel: Iron Profile  -     CT Abdomen Pelvis Without Contrast; Future  -     Comprehensive Metabolic Panel; Future  -     CBC & Differential; Future  -     Iron Profile; Future  -     Vitamin B12 & Folate; Future      37-year-old female presenting to the office today for Follow-up with a history of Crohn's ileitis status post ileal resection in November 2020 at the time of her initial diagnosis.  Surgery was performed by Dr. Garcia in [place].  She had a temporary ileostomy with subsequent reversal in January 2021.  Patient continues on Remicade, colestipol and Pentasa.  We will discontinue Pentasa at this time.  I have ordered a CT scan of the abdomen and pelvis with oral contrast for evaluation of the patient's right lower quadrant pain.  I have placed her on a 3-month follow-up for the office.  I have ordered labs to include a CBC, CMP, B12 folate and iron profile.  Patient agreeable to this plan and will follow-up in 3 months.          Follow Up       Follow Up   Return in about 3 months (around 11/23/2022).  Patient was given instructions and counseling regarding her condition or for health maintenance advice. Please see specific information pulled into the AVS if appropriate.

## 2022-08-23 ENCOUNTER — OFFICE VISIT (OUTPATIENT)
Dept: GASTROENTEROLOGY | Facility: CLINIC | Age: 37
End: 2022-08-23

## 2022-08-23 VITALS
DIASTOLIC BLOOD PRESSURE: 81 MMHG | WEIGHT: 268.6 LBS | BODY MASS INDEX: 44.75 KG/M2 | HEIGHT: 65 IN | HEART RATE: 90 BPM | OXYGEN SATURATION: 99 % | SYSTOLIC BLOOD PRESSURE: 131 MMHG

## 2022-08-23 DIAGNOSIS — R19.4 ALTERED BOWEL HABITS: ICD-10-CM

## 2022-08-23 DIAGNOSIS — R10.84 GENERALIZED ABDOMINAL PAIN: ICD-10-CM

## 2022-08-23 DIAGNOSIS — Z79.899 MEDICATION MANAGEMENT: ICD-10-CM

## 2022-08-23 DIAGNOSIS — K50.00 CROHN'S DISEASE OF SMALL INTESTINE WITHOUT COMPLICATION: Primary | ICD-10-CM

## 2022-08-23 DIAGNOSIS — R10.31 RIGHT LOWER QUADRANT ABDOMINAL PAIN: ICD-10-CM

## 2022-08-23 PROCEDURE — 99214 OFFICE O/P EST MOD 30 MIN: CPT | Performed by: NURSE PRACTITIONER

## 2022-08-23 RX ORDER — PHENTERMINE HYDROCHLORIDE 37.5 MG/1
37.5 CAPSULE ORAL EVERY MORNING
COMMUNITY

## 2022-08-23 NOTE — PATIENT INSTRUCTIONS
Crohn's Disease    Crohn's disease is a long-lasting (chronic) disease that affects the gastrointestinal (GI) tract. Crohn's disease often causes irritation and inflammation in the small intestine and the beginning of the large intestine, but it can affect any part of the GI tract. Crohn's disease is part of a group of illnesses that are known as inflammatory bowel disease (IBD).  Crohn's disease may start slowly and get worse over time. Symptoms may come and go. They may also go away for months or even years at a time (remission).  What are the causes?  The exact cause of this condition is not known. It may involve a response that causes your body's disease-fighting (immune) system to attack healthy cells and tissues (autoimmune response). Bacteria, genes, and your environment may also play a role.  What increases the risk?  The following factors may make you more likely to develop this condition:  Having a family member who has Crohn's disease, another IBD, or an autoimmune condition.  Using products that contain nicotine or tobacco, such as cigarettes and e-cigarettes.  Being in your 20s.  Having Eastern  ancestry.  What are the signs or symptoms?  The main symptoms of this condition involve your GI tract. These include:  Diarrhea.  Pain or cramping in the abdomen commonly felt in the lower right side of the abdomen.  Frequent watery or bloody stools.  Constipation. This may mean having:  Fewer bowel movements in a week than normal.  Difficulty having a bowel movement.  Stools that are dry, hard, or larger than normal.  Rectal bleeding.  Rectal pain.  An urgent need to have a bowel movement.  The feeling that you are not finished having a bowel movement.  Other symptoms may include:  Unexplained weight loss.  Fatigue.  Fever.  Nausea or appetite loss.  Joint pain.  Vision changes.  Red bumps or sores on the skin.  Sores inside the mouth.  How is this diagnosed?  This condition may be diagnosed based  on:  Your symptoms and medical history.  A physical exam.  Tests, which may include:  Blood tests.  Stool sample tests.  Imaging tests, such as X-rays and CT scans.  Tests to examine the inside of your intestines using a long, flexible tube that has a light and a camera on the end (colonoscopy).  A procedure to remove tissue samples from inside your bowel for testing (biopsy).  You may need to work with a health care provider who specializes in diseases of the digestive tract (gastroenterologist).  How is this treated?  There is no cure for this condition, and it affects each person differently. Treatment can help you manage your symptoms. Your treatment may include:  Resting your bowels. This involves having a period of healing time when your bowels are not passing stools. This may be done by:  Drinking only clear liquids. These are liquids that you can see through, such as water, black coffee, fruit juice without pulp, broth, gelatin, and ice pops.  Getting nutrition through an IV for a period of time.  Medicines. These may be used by themselves or with other treatments (combination therapy). You may be given medicines that help to:  Reduce inflammation.  Control your immune system activity.  Fight infections.  Relieve cramps and prevent diarrhea.  Control your pain.  Surgery. You may need surgery if:  Medicines and other treatments are not working anymore.  You develop complications from severe Crohn's disease.  A section of your intestine becomes so damaged that it needs to be removed.  Follow these instructions at home:  Medicines  Take over-the-counter and prescription medicines only as told by your health care provider.  If you were prescribed an antibiotic, take it as told by your health care provider. Do not stop taking the antibiotic even if you start to feel better.  Avoid taking ibuprofen or other NSAID medicines if possible, these can make Crohn's disease worse.  Eating and drinking  Talk with your  health care provider or a diet and nutrition specialist (registered dietitian) about what diet is best for you.  Drink enough fluid to keep your urine pale yellow.  If you are taking steroids to reduce inflammation, get plenty of calcium in your diet to help keep your bones healthy. You may also consider taking a calcium supplement with vitamin D.  Keep a food diary to identify foods that make your symptoms better or worse, and avoid foods that cause symptoms.  Follow instructions from your health care provider about eating or drinking restrictions if you have worsening symptoms (flare-up).  Limit alcohol intake to no more than 1 drink a day for nonpregnant women and 2 drinks a day for men. One drink equals 12 oz of beer, 5 oz of wine, or 1½ oz of hard liquor.  General instructions  Make sure you get all the vaccines that your health care provider recommends, especially pneumonia (pneumococcal) and flu (influenza) vaccines.  Do not use any products that contain nicotine or tobacco, such as cigarettes and e-cigarettes. If you need help quitting, ask your health care provider.  Exercise every day, or as often told by your health care provider.  Keep all follow-up visits as told by your health care provider. This is important.  Contact a health care provider if:  You have diarrhea, cramps in your abdomen, and other GI problems that are present almost all the time.  Your symptoms do not improve with treatment.  You continue to lose weight.  You develop a rash or sores on your skin.  You develop eye problems.  You have a fever.  Your symptoms get worse or you develop new symptoms.  Get help right away if:  You have bloody diarrhea.  You have severe pain in your abdomen.  You cannot pass stools.  Summary  Crohn's disease affects each person differently. The cause of this condition is not known, but it may involve a response that causes your body's immune system to attack healthy cells and tissues.  There are multiple  treatment options that can help you manage the condition. Talk with your health care provider or diet and nutrition specialist (registered dietitian) about what diet is best for you.  Make sure you get all the vaccines that your health care provider recommends, especially pneumonia (pneumococcal) and flu (influenza) vaccines.  This information is not intended to replace advice given to you by your health care provider. Make sure you discuss any questions you have with your health care provider.  Document Revised: 08/19/2021 Document Reviewed: 08/19/2021  [employer] Patient Education © 2021 [employer]

## 2022-09-01 DIAGNOSIS — R10.31 RIGHT LOWER QUADRANT ABDOMINAL PAIN: ICD-10-CM

## 2022-09-01 DIAGNOSIS — R19.4 ALTERED BOWEL HABITS: ICD-10-CM

## 2022-09-01 DIAGNOSIS — K50.00 CROHN'S DISEASE OF SMALL INTESTINE WITHOUT COMPLICATION: ICD-10-CM

## 2022-09-01 DIAGNOSIS — Z79.899 MEDICATION MANAGEMENT: ICD-10-CM

## 2022-09-01 DIAGNOSIS — R10.84 GENERALIZED ABDOMINAL PAIN: ICD-10-CM

## 2022-09-19 ENCOUNTER — TELEPHONE (OUTPATIENT)
Dept: GASTROENTEROLOGY | Facility: CLINIC | Age: 37
End: 2022-09-19

## 2022-09-20 ENCOUNTER — APPOINTMENT (OUTPATIENT)
Dept: CT IMAGING | Facility: HOSPITAL | Age: 37
End: 2022-09-20

## 2022-10-06 NOTE — TELEPHONE ENCOUNTER
Case is still denied by insurance company. An appeal would be the next step. I spoke with Cici DAVILA with [employer]. I spoke with pt. She is still having the right lower quadrant pain. Pt states on 10/17/22 she is changing insurances. I advised pt to send us a [employer] message with this information whenever it becomes available, or bring the cards by the office. We will then try to authorize CT scan through the new insurance company. Pt agreed to this plan. Pt aware to go to the ER if pain becomes unbearable. Pt expressed understanding.

## 2022-10-25 RX ORDER — MONTELUKAST SODIUM 4 MG/1
2 TABLET, CHEWABLE ORAL 2 TIMES DAILY
Qty: 120 TABLET | Refills: 3 | Status: SHIPPED | OUTPATIENT
Start: 2022-10-25 | End: 2023-03-08

## 2022-10-26 NOTE — TELEPHONE ENCOUNTER
Will cancel order for now. Pt insurance will not be active til the first of the years. She is going to add that information into her mychart so we have it

## 2023-02-02 ENCOUNTER — OFFICE VISIT (OUTPATIENT)
Dept: GASTROENTEROLOGY | Facility: CLINIC | Age: 38
End: 2023-02-02
Payer: COMMERCIAL

## 2023-02-02 VITALS
OXYGEN SATURATION: 99 % | WEIGHT: 231 LBS | HEART RATE: 62 BPM | BODY MASS INDEX: 39.44 KG/M2 | DIASTOLIC BLOOD PRESSURE: 81 MMHG | HEIGHT: 64 IN | SYSTOLIC BLOOD PRESSURE: 123 MMHG

## 2023-02-02 DIAGNOSIS — K50.00 CROHN'S DISEASE OF SMALL INTESTINE WITHOUT COMPLICATION: Primary | ICD-10-CM

## 2023-02-02 DIAGNOSIS — K62.5 RECTAL BLEEDING: ICD-10-CM

## 2023-02-02 DIAGNOSIS — Z79.899 MEDICATION MANAGEMENT: ICD-10-CM

## 2023-02-02 PROCEDURE — 99214 OFFICE O/P EST MOD 30 MIN: CPT | Performed by: NURSE PRACTITIONER

## 2023-02-02 RX ORDER — LORATADINE 10 MG/1
1 TABLET ORAL DAILY
COMMUNITY
Start: 2022-12-19

## 2023-02-02 RX ORDER — MEDROXYPROGESTERONE ACETATE 10 MG/1
TABLET ORAL
COMMUNITY
Start: 2023-01-12

## 2023-02-02 RX ORDER — SYRINGE WITH NEEDLE, 1 ML 25GX5/8"
SYRINGE, EMPTY DISPOSABLE MISCELLANEOUS
COMMUNITY
Start: 2023-01-10

## 2023-02-02 RX ORDER — PHENTERMINE HYDROCHLORIDE 37.5 MG/1
37.5 TABLET ORAL EVERY MORNING
COMMUNITY
Start: 2022-12-06 | End: 2023-02-02

## 2023-02-02 NOTE — PROGRESS NOTES
Chief Complaint   3m needs hep and quant labs ordered   History of Present Illness       Aye Glez is a 37 y.o. female who [facility] with a history of Crohn's ileitis status post ileal resection in November 2020 at the time of her initial diagnosis.  Surgery was performed by Dr. Garcia in [place].  She had a temporary ileostomy with subsequent reversal in January 2021.    Patient was previously on Pentasa and colestipol we transition to Remicade infusions every 8 weeks back in April 2022.   Patient's next infusion is 2/14/2023.  Today the patient reports improved bowel movements with occasional bouts of diarrhea.  She reports increased amount of stress as her grandfather has stage IV tongue cancer and family stressors.   Patient reports resolution of the right lower quadrant that she was previously experiencing back in August.  She continues on colestipol 2 g twice daily, Remicade infusions every 8 weeks, Levsin as needed for cramps, Imodium as needed for diarrhea and probiotics daily.  Patient reports that she has been having more frequent headaches that she is taking ibuprofen 2-3 times per day.    Patient reports weight loss with effort and is currently on phentermine. Patient denies fever,vomiting, weight loss, night sweats, melena, hematochezia, hematemesis.     Endoscopy: Review of the patient's most recent EGD and colonoscopy performed by Dr. Law on 01.31.2022 patent and then ileocolonic anastomosis characterized by ulceration normal mucosa in the entire colon.  Plan to begin Humira.  Repeat 2 years.    Most recent labs on 08.31.2022 see below.  Results       Result Review :                             Past Medical History       Past Medical History:   Diagnosis Date   • Anemia    • Crohn's disease (HCC)    • Lactose intolerance    • Ulcer     Last colonoscopy       Past Surgical History:   Procedure Laterality Date   •  "ABDOMINAL SURGERY     • CHOLECYSTECTOMY     • COLON RESECTION  2021   • COLONOSCOPY N/A 01/31/2022    Procedure: COLONOSCOPY WITH BX;  Surgeon: Torito Law MD;  Location: [facility] ENDOSCOPY;  Service: Gastroenterology;  Laterality: N/A;  ULCERS AT ANASTOMOSIS SITE, PRIOR SURGERY RIGHT COLON    • ENDOSCOPY  2020   • ENDOSCOPY N/A 01/31/2022    Procedure: ESOPHAGOGASTRODUODENOSCOPY WITH BX;  Surgeon: Torito Law MD;  Location: [facility] ENDOSCOPY;  Service: Gastroenterology;  Laterality: N/A;  NORMAL EGD   • ILEOSTOMY  2020   • ILEOSTOMY REVISION  2021   • TONSILLECTOMY     • TUBAL ABDOMINAL LIGATION     • UPPER GASTROINTESTINAL ENDOSCOPY           Current Outpatient Medications:   •  B-D 3CC LUER-TREMAINE SYR 25GX1\" 25G X 1\" 3 ML misc, USE 1 SYRINGE INTRAMUSCULARLY ONCE EVERY MONTH, Disp: , Rfl:   •  cholecalciferol (VITAMIN D3) 1.25 MG (09712 UT) capsule, Take 1 capsule by mouth Every 7 (Seven) Days., Disp: , Rfl:   •  colestipol (COLESTID) 1 g tablet, Take 2 tablets by mouth 2 (Two) Times a Day., Disp: 120 tablet, Rfl: 3  •  cyanocobalamin 1000 MCG/ML injection, Inject 1 mcg into the appropriate muscle as directed by prescriber Take As Directed., Disp: , Rfl:   •  Hydrocort-Pramoxine, Perianal, (ANALPRAM-HC) 2.5-1 % rectal cream, Insert  into the rectum 3 (Three) Times a Day. Apply 1 inch TID for 7 days, Disp: 4 g, Rfl: 1  •  hyoscyamine (LEVSIN) 0.125 MG SL tablet, TAKE ONE TABLET BY MOUTH EVERY 4 HOURS AS NEEDED FOR CRAMPING FOR UP TO 30 DAYS., Disp: 120 tablet, Rfl: 1  •  ibuprofen (ADVIL,MOTRIN) 600 MG tablet, Take 600 mg by mouth As Needed for Mild Pain ., Disp: , Rfl:   •  inFLIXimab (REMICADE IV), Infuse  into a venous catheter., Disp: , Rfl:   •  loperamide (IMODIUM) 2 MG capsule, Take 2 mg by mouth 4 (Four) Times a Day As Needed for Diarrhea., Disp: , Rfl:   •  loratadine (CLARITIN) 10 MG tablet, Take 1 tablet by mouth Daily., Disp: , Rfl:   •  medroxyPROGESTERone (PROVERA) 10 MG tablet, , " "Disp: , Rfl:   •  montelukast (SINGULAIR) 10 MG tablet, Take 1 tablet by mouth Daily., Disp: , Rfl:   •  ondansetron (ZOFRAN) 8 MG tablet, Take 1 tablet by mouth Daily As Needed., Disp: , Rfl:   •  phentermine 37.5 MG capsule, Take 37.5 mg by mouth Every Morning., Disp: , Rfl:   •  Probiotic Product (PROBIOTIC-10 ULTIMATE PO), Take  by mouth., Disp: , Rfl:   •  zolpidem (AMBIEN) 5 MG tablet, Take 5 mg by mouth At Night As Needed for Sleep., Disp: , Rfl:      Allergies   Allergen Reactions   • Iodine GI Intolerance   • Sulfa Antibiotics Hives       Family History   Problem Relation Age of Onset   • Diabetes Mother    • Liver disease Mother    • Heart attack Sister 25        PASSED AWAY   • Asthma Brother    • Diabetes Maternal Grandfather    • Hearing loss Maternal Grandfather    • Esophageal cancer Maternal Grandfather         Squamous cell carcinoma, focally keratinizing, p16 positive   • Colon cancer Neg Hx    • Malig Hyperthermia Neg Hx         Social History     Social History Narrative   • Not on file       Objective     Vital Signs:   /81 (BP Location: Left arm, Patient Position: Sitting, Cuff Size: Adult)   Pulse 62   Ht 162.6 cm (64\")   Wt 105 kg (231 lb)   SpO2 99%   BMI 39.65 kg/m²       Physical Exam  Constitutional:       General: She is not in acute distress.     Appearance: Normal appearance. She is well-developed and normal weight.   Eyes:      Conjunctiva/sclera: Conjunctivae normal.      Pupils: Pupils are equal, round, and reactive to light.      Visual Fields: Right eye visual fields normal and left eye visual fields normal.   Cardiovascular:      Rate and Rhythm: Normal rate and regular rhythm.      Heart sounds: Normal heart sounds.   Pulmonary:      Effort: Pulmonary effort is normal. No retractions.      Breath sounds: Normal breath sounds and air entry.      Comments: Inspection of chest: normal appearance  Abdominal:      General: Bowel sounds are normal.      Palpations: Abdomen " is soft.      Tenderness: There is no abdominal tenderness.      Comments: No appreciable hepatosplenomegaly   Musculoskeletal:      Cervical back: Neck supple.      Right lower leg: No edema.      Left lower leg: No edema.   Lymphadenopathy:      Cervical: No cervical adenopathy.   Skin:     Findings: No lesion.      Comments: Turgor normal   Neurological:      Mental Status: She is alert and oriented to person, place, and time.   Psychiatric:         Mood and Affect: Mood and affect normal.           Assessment & Plan          Assessment and Plan    Diagnoses and all orders for this visit:    1. Crohn's disease of small intestine without complication (HCC) (Primary)  -     Cancel: CBC & Differential; Future  -     Cancel: Comprehensive Metabolic Panel; Future  -     Cancel: Vitamin B12 & Folate; Future  -     Cancel: Iron Profile; Future  -     Cancel: Sedimentation Rate; Future  -     Cancel: C-reactive Protein; Future  -     Cancel: Hepatitis Panel, Acute; Future  -     Cancel: QuantiFERON TB Gold; Future  -     Cancel: Infliximab and Anti-Infliximab AB DoseASSURE IFX; Future  -     CBC & Differential; Future  -     Comprehensive Metabolic Panel; Future  -     C-reactive Protein; Future  -     Hepatitis Panel, Acute; Future  -     Infliximab and Anti-Infliximab AB DoseASSURE IFX; Future  -     Iron Profile; Future  -     QuantiFERON TB Gold; Future  -     Sedimentation Rate; Future  -     Vitamin B12 & Folate; Future    2. Medication management  -     Cancel: CBC & Differential; Future  -     Cancel: Comprehensive Metabolic Panel; Future  -     Cancel: Vitamin B12 & Folate; Future  -     Cancel: Iron Profile; Future  -     Cancel: Sedimentation Rate; Future  -     Cancel: C-reactive Protein; Future  -     Cancel: Hepatitis Panel, Acute; Future  -     Cancel: QuantiFERON TB Gold; Future  -     Cancel: Infliximab and Anti-Infliximab AB DoseASSURE IFX; Future  -     CBC & Differential; Future  -     Comprehensive  Metabolic Panel; Future  -     C-reactive Protein; Future  -     Hepatitis Panel, Acute; Future  -     Infliximab and Anti-Infliximab AB DoseASSURE IFX; Future  -     Iron Profile; Future  -     QuantiFERON TB Gold; Future  -     Sedimentation Rate; Future  -     Vitamin B12 & Folate; Future    3. Rectal bleeding      37-year-old female presenting the office today follow up with a history of Crohn's ileitis status post ileal resection in November 2020 at the time of her initial diagnosis.  Surgery was performed by Dr. Garcia in [place].  She had a temporary ileostomy with subsequent reversal in January 2021.  Patient will continue Remicade infusions every 8 weeks, colestipol 2 g twice daily, Levsin as needed, Imodium as needed and probiotics.  I have ordered lab work for the patient to complete including a CBC, CMP, vitamin B12, iron, sed rate, CRP, hep panel, quant TB and a Remicade level with antibody as the patient is having intermittent diarrhea and rectal bleeding.  We will follow-up in the office in 3 months.  Patient agreeable to this plan and will call with any questions or concerns.          Follow Up       Follow Up   Return in about 3 months (around 5/2/2023).  Patient was given instructions and counseling regarding her condition or for health maintenance advice. Please see specific information pulled into the AVS if appropriate.

## 2023-02-14 ENCOUNTER — TELEPHONE (OUTPATIENT)
Dept: GASTROENTEROLOGY | Facility: CLINIC | Age: 38
End: 2023-02-14
Payer: MEDICAID

## 2023-02-14 DIAGNOSIS — K50.00 CROHN'S DISEASE OF SMALL INTESTINE WITHOUT COMPLICATION: ICD-10-CM

## 2023-02-14 DIAGNOSIS — Z79.899 MEDICATION MANAGEMENT: ICD-10-CM

## 2023-02-14 DIAGNOSIS — R74.8 ELEVATED LIVER ENZYMES: Primary | ICD-10-CM

## 2023-02-20 ENCOUNTER — TELEPHONE (OUTPATIENT)
Dept: GASTROENTEROLOGY | Facility: CLINIC | Age: 38
End: 2023-02-20
Payer: MEDICAID

## 2023-02-20 NOTE — TELEPHONE ENCOUNTER
Patient called the office stating she received a letter regarding overdue labs. I explained to patient that [facility] doesn't always send us the results once the labs are sent out. I have called [facility] and requested the remaining labs. Pt is aware we will contact her when we receive these results and they're reviewed by a provider. Pt agreed to this plan.

## 2023-03-08 RX ORDER — MONTELUKAST SODIUM 4 MG/1
TABLET, CHEWABLE ORAL
Qty: 120 TABLET | Refills: 0 | Status: SHIPPED | OUTPATIENT
Start: 2023-03-08

## 2023-03-20 ENCOUNTER — HOSPITAL ENCOUNTER (OUTPATIENT)
Dept: ULTRASOUND IMAGING | Facility: HOSPITAL | Age: 38
Discharge: HOME | End: 2023-03-20
Admitting: NURSE PRACTITIONER
Payer: COMMERCIAL

## 2023-03-20 DIAGNOSIS — R74.8 ELEVATED LIVER ENZYMES: ICD-10-CM

## 2023-03-20 PROCEDURE — 76705 ECHO EXAM OF ABDOMEN: CPT

## 2023-03-22 ENCOUNTER — TELEPHONE (OUTPATIENT)
Dept: GASTROENTEROLOGY | Facility: CLINIC | Age: 38
End: 2023-03-22
Payer: COMMERCIAL

## 2023-03-22 NOTE — TELEPHONE ENCOUNTER
----- Message from YNES Torres sent at 3/20/2023 11:48 AM EDT -----  Borderline enlarged liver consistent with fatty liver.  Continue healthy lifestyle and diet as we discussed in office.

## 2023-03-22 NOTE — TELEPHONE ENCOUNTER
Spoke to pt on results voiced understanding. Pt wants to know when she can restart her B-12 shot?

## 2023-03-23 NOTE — TELEPHONE ENCOUNTER
Patient belongings where left they will be in ER case mangers office.  Will be picked up by GMT 05/01/2022.   Spoke to pt on this she voiced understanding

## 2023-03-27 ENCOUNTER — TELEPHONE (OUTPATIENT)
Dept: GASTROENTEROLOGY | Facility: CLINIC | Age: 38
End: 2023-03-27
Payer: COMMERCIAL

## 2023-03-27 NOTE — TELEPHONE ENCOUNTER
Spoke with pt in regards to overdue results. Pt states she had completed the testing on 2/13/2023 but does not know what [facility] done with these. Advised pt it would be best to go do those again. Pt states understanding.

## 2023-03-31 DIAGNOSIS — Z79.899 MEDICATION MANAGEMENT: ICD-10-CM

## 2023-03-31 DIAGNOSIS — K50.00 CROHN'S DISEASE OF SMALL INTESTINE WITHOUT COMPLICATION: ICD-10-CM

## 2023-04-03 ENCOUNTER — TELEPHONE (OUTPATIENT)
Dept: GASTROENTEROLOGY | Facility: CLINIC | Age: 38
End: 2023-04-03
Payer: COMMERCIAL

## 2023-04-03 NOTE — TELEPHONE ENCOUNTER
----- Message from YNES Torres sent at 3/31/2023  4:13 PM EDT -----  Please call the patient and inform her that I reviewed her labs.  Glucose normal when labs were drawn.  Mildly decreased sodium.  Mildly elevated liver enzyme.  Vitamin B12 high.  Folate normal.  QuantiFERON-TB negative.  Infliximab drug level normal.  No antibodies noted.

## 2023-05-05 RX ORDER — MONTELUKAST SODIUM 4 MG/1
TABLET, CHEWABLE ORAL
Qty: 120 TABLET | Refills: 0 | Status: SHIPPED | OUTPATIENT
Start: 2023-05-05

## 2023-05-08 ENCOUNTER — OFFICE VISIT (OUTPATIENT)
Dept: GASTROENTEROLOGY | Facility: CLINIC | Age: 38
End: 2023-05-08
Payer: COMMERCIAL

## 2023-05-08 VITALS
OXYGEN SATURATION: 99 % | DIASTOLIC BLOOD PRESSURE: 76 MMHG | BODY MASS INDEX: 35.84 KG/M2 | HEIGHT: 66 IN | HEART RATE: 77 BPM | WEIGHT: 223 LBS | SYSTOLIC BLOOD PRESSURE: 144 MMHG

## 2023-05-08 DIAGNOSIS — K50.012 CROHN'S DISEASE OF SMALL INTESTINE WITH INTESTINAL OBSTRUCTION: Primary | ICD-10-CM

## 2023-05-08 DIAGNOSIS — R11.0 NAUSEA: ICD-10-CM

## 2023-05-08 DIAGNOSIS — R10.31 RIGHT LOWER QUADRANT ABDOMINAL PAIN: ICD-10-CM

## 2023-05-08 DIAGNOSIS — R74.8 ELEVATED LIVER ENZYMES: ICD-10-CM

## 2023-05-08 DIAGNOSIS — Z79.899 MEDICATION MANAGEMENT: ICD-10-CM

## 2023-05-08 DIAGNOSIS — Z90.49 S/P COLON RESECTION: ICD-10-CM

## 2023-05-08 PROCEDURE — 1160F RVW MEDS BY RX/DR IN RCRD: CPT | Performed by: NURSE PRACTITIONER

## 2023-05-08 PROCEDURE — 99214 OFFICE O/P EST MOD 30 MIN: CPT | Performed by: NURSE PRACTITIONER

## 2023-05-08 PROCEDURE — 1159F MED LIST DOCD IN RCRD: CPT | Performed by: NURSE PRACTITIONER

## 2023-05-08 RX ORDER — ZOLPIDEM TARTRATE 10 MG/1
10 TABLET ORAL
COMMUNITY
Start: 2023-04-07

## 2023-05-08 RX ORDER — LOPERAMIDE HYDROCHLORIDE 2 MG/1
2 CAPSULE ORAL 4 TIMES DAILY PRN
Qty: 60 CAPSULE | Refills: 2 | Status: SHIPPED | OUTPATIENT
Start: 2023-05-08

## 2023-05-08 NOTE — PROGRESS NOTES
Chief Complaint   Follow-up and Abdominal Pain (Lower right side last a few hours/ little over a week ago)    History of Present Illness       Aye Glez is a 38 y.o. female who presents to [facility] GASTROENTEROLOGY follow up with a history of Crohn's ileitis status post ileal resection in November 2020 at the time of her initial diagnosis.  Surgery was performed by Dr. Garcia in [place].  She had a temporary ileostomy with subsequent reversal in January 2021.    Patient was previously on Pentasa and colestipol we transition to Remicade infusions every 8 weeks back in April 2022.   Patient's last Remicade infusion was 4/11/2023.  Today the patient reports improved bowel movements while on Remicade using colestipol 2 g twice daily.  She reports increased amount of stress as she recently lost her grandfather to stage IV tongue cancer.     Patient reports last Friday having severe right lower quadrant abdominal pain that lasted 5 to 6 hours and then resolved.  Patient reports that she is only been able to eat 1 piece of toast coffee and water each day.  She continues on colestipol 2 g twice daily, Remicade infusions every 8 weeks, Levsin as needed for cramps, Imodium as needed for diarrhea and probiotics daily.    Patient reports weight loss with effort. Patient denies fever,vomiting, weight loss, night sweats, melena, hematochezia, hematemesis.     Endoscopy: Review of the patient's most recent EGD and colonoscopy performed by Dr. Law on 01.31.2022 patent and then ileocolonic anastomosis characterized by ulceration normal mucosa in the entire colon.  Plan to begin Humira.  Repeat 2 years, January 2024.    Labs 2/13/2023 glucose normal, kidney function normal, ALT 40, B12 1873, folate normal, CRP normal, iron saturation 14, quant TB negative, hep panel negative.  Remicade level 5.3 no antibodies identified.      Results       Result Review :                             Past  "Medical History       Past Medical History:   Diagnosis Date   • Anemia    • Crohn's disease    • Lactose intolerance    • Ulcer     Last colonoscopy       Past Surgical History:   Procedure Laterality Date   • ABDOMINAL SURGERY     • CHOLECYSTECTOMY     • COLON RESECTION  2021   • COLONOSCOPY N/A 01/31/2022    Procedure: COLONOSCOPY WITH BX;  Surgeon: Torito Law MD;  Location: [facility] ENDOSCOPY;  Service: Gastroenterology;  Laterality: N/A;  ULCERS AT ANASTOMOSIS SITE, PRIOR SURGERY RIGHT COLON    • ENDOSCOPY  2020   • ENDOSCOPY N/A 01/31/2022    Procedure: ESOPHAGOGASTRODUODENOSCOPY WITH BX;  Surgeon: Torito Law MD;  Location: [facility] ENDOSCOPY;  Service: Gastroenterology;  Laterality: N/A;  NORMAL EGD   • ILEOSTOMY  2020   • ILEOSTOMY REVISION  2021   • TONSILLECTOMY     • TUBAL ABDOMINAL LIGATION     • UPPER GASTROINTESTINAL ENDOSCOPY           Current Outpatient Medications:   •  B-D 3CC LUER-TREMAINE SYR 25GX1\" 25G X 1\" 3 ML misc, USE 1 SYRINGE INTRAMUSCULARLY ONCE EVERY MONTH, Disp: , Rfl:   •  cholecalciferol (VITAMIN D3) 1.25 MG (19545 UT) capsule, Take 1 capsule by mouth Every 7 (Seven) Days., Disp: , Rfl:   •  colestipol (COLESTID) 1 g tablet, Take 2 tablets by mouth twice daily, Disp: 120 tablet, Rfl: 0  •  cyanocobalamin 1000 MCG/ML injection, Inject 1 mcg into the appropriate muscle as directed by prescriber Take As Directed., Disp: , Rfl:   •  Hydrocort-Pramoxine, Perianal, (ANALPRAM-HC) 2.5-1 % rectal cream, Insert  into the rectum 3 (Three) Times a Day. Apply 1 inch TID for 7 days, Disp: 4 g, Rfl: 1  •  hyoscyamine (LEVSIN) 0.125 MG SL tablet, TAKE ONE TABLET BY MOUTH EVERY 4 HOURS AS NEEDED FOR CRAMPING FOR UP TO 30 DAYS., Disp: 120 tablet, Rfl: 1  •  ibuprofen (ADVIL,MOTRIN) 600 MG tablet, Take 1 tablet by mouth As Needed for Mild Pain., Disp: , Rfl:   •  inFLIXimab (REMICADE IV), Infuse  into a venous catheter., Disp: , Rfl:   •  loperamide (IMODIUM) 2 MG capsule, Take 1 capsule " "by mouth 4 (Four) Times a Day As Needed for Diarrhea., Disp: 60 capsule, Rfl: 2  •  loratadine (CLARITIN) 10 MG tablet, Take 1 tablet by mouth Daily., Disp: , Rfl:   •  medroxyPROGESTERone (PROVERA) 10 MG tablet, , Disp: , Rfl:   •  montelukast (SINGULAIR) 10 MG tablet, Take 1 tablet by mouth Daily., Disp: , Rfl:   •  ondansetron (ZOFRAN) 8 MG tablet, Take 1 tablet by mouth Daily As Needed., Disp: , Rfl:   •  Probiotic Product (PROBIOTIC-10 ULTIMATE PO), Take  by mouth., Disp: , Rfl:   •  zolpidem (AMBIEN) 10 MG tablet, Take 1 tablet by mouth every night at bedtime., Disp: , Rfl:      Allergies   Allergen Reactions   • Iodine GI Intolerance   • Sulfa Antibiotics Hives       Family History   Problem Relation Age of Onset   • Diabetes Mother    • Liver disease Mother    • Heart attack Sister 25        PASSED AWAY   • Asthma Brother    • Diabetes Maternal Grandfather    • Hearing loss Maternal Grandfather    • Esophageal cancer Maternal Grandfather         Squamous cell carcinoma, focally keratinizing, p16 positive   • Colon cancer Neg Hx    • Malig Hyperthermia Neg Hx         Social History     Social History Narrative   • Not on file       Objective   Vital Signs:   /76 (BP Location: Right arm, Patient Position: Sitting, Cuff Size: Adult)   Pulse 77   Ht 167.6 cm (66\")   Wt 101 kg (223 lb)   SpO2 99%   BMI 35.99 kg/m²       Physical Exam  Constitutional:       General: She is not in acute distress.     Appearance: Normal appearance. She is well-developed.   Eyes:      Conjunctiva/sclera: Conjunctivae normal.      Pupils: Pupils are equal, round, and reactive to light.      Visual Fields: Right eye visual fields normal and left eye visual fields normal.   Cardiovascular:      Rate and Rhythm: Normal rate and regular rhythm.      Heart sounds: Normal heart sounds.   Pulmonary:      Effort: Pulmonary effort is normal. No retractions.      Breath sounds: Normal breath sounds and air entry.      Comments: " Inspection of chest: normal appearance  Abdominal:      General: Bowel sounds are normal.      Palpations: Abdomen is soft.      Tenderness: There is no abdominal tenderness.      Comments: No appreciable hepatosplenomegaly   Musculoskeletal:      Cervical back: Neck supple.      Right lower leg: No edema.      Left lower leg: No edema.   Lymphadenopathy:      Cervical: No cervical adenopathy.   Skin:     Findings: No lesion.      Comments: Turgor normal   Neurological:      Mental Status: She is alert and oriented to person, place, and time.   Psychiatric:         Mood and Affect: Mood and affect normal.           Assessment & Plan          Assessment and Plan    Diagnoses and all orders for this visit:    1. Crohn's disease of small intestine with intestinal obstruction (Primary)  -     CBC & Differential; Future  -     Comprehensive Metabolic Panel; Future  -     Vitamin B12 & Folate; Future  -     Iron Profile; Future  -     CT Abdomen Pelvis Without Contrast; Future  -     loperamide (IMODIUM) 2 MG capsule; Take 1 capsule by mouth 4 (Four) Times a Day As Needed for Diarrhea.  Dispense: 60 capsule; Refill: 2    2. Right lower quadrant abdominal pain  -     CBC & Differential; Future  -     Comprehensive Metabolic Panel; Future  -     Vitamin B12 & Folate; Future  -     Iron Profile; Future  -     CT Abdomen Pelvis Without Contrast; Future  -     loperamide (IMODIUM) 2 MG capsule; Take 1 capsule by mouth 4 (Four) Times a Day As Needed for Diarrhea.  Dispense: 60 capsule; Refill: 2    3. Nausea  -     CBC & Differential; Future  -     Comprehensive Metabolic Panel; Future  -     Vitamin B12 & Folate; Future  -     Iron Profile; Future  -     CT Abdomen Pelvis Without Contrast; Future  -     loperamide (IMODIUM) 2 MG capsule; Take 1 capsule by mouth 4 (Four) Times a Day As Needed for Diarrhea.  Dispense: 60 capsule; Refill: 2    4. Medication management  -     CBC & Differential; Future  -     Comprehensive  Metabolic Panel; Future  -     Vitamin B12 & Folate; Future  -     Iron Profile; Future  -     CT Abdomen Pelvis Without Contrast; Future  -     loperamide (IMODIUM) 2 MG capsule; Take 1 capsule by mouth 4 (Four) Times a Day As Needed for Diarrhea.  Dispense: 60 capsule; Refill: 2    5. Elevated liver enzymes  -     CBC & Differential; Future  -     Comprehensive Metabolic Panel; Future  -     Vitamin B12 & Folate; Future  -     Iron Profile; Future  -     CT Abdomen Pelvis Without Contrast; Future  -     loperamide (IMODIUM) 2 MG capsule; Take 1 capsule by mouth 4 (Four) Times a Day As Needed for Diarrhea.  Dispense: 60 capsule; Refill: 2    6. S/P colon resection  -     CBC & Differential; Future  -     Comprehensive Metabolic Panel; Future  -     Vitamin B12 & Folate; Future  -     Iron Profile; Future  -     CT Abdomen Pelvis Without Contrast; Future  -     loperamide (IMODIUM) 2 MG capsule; Take 1 capsule by mouth 4 (Four) Times a Day As Needed for Diarrhea.  Dispense: 60 capsule; Refill: 2         38-year-old female presenting the office today follow up with a history of Crohn's ileitis status post ileal resection in November 2020 at the time of her initial diagnosis.  Surgery was performed by Dr. Garcia in [place].  She had a temporary ileostomy with subsequent reversal in January 2021.  Patient will continue Remicade infusions every 8 weeks, colestipol 2 g twice daily, Levsin as needed, Imodium as needed and probiotics.  I have ordered lab work for the patient to complete including a CBC, CMP, vitamin B12, iron, as the patient is experiencing right lower quadrant abdominal pain and fatigue.  We will proceed with a CT scan of the abdomen and pelvis to the right lower quadrant abdominal pain. We will follow-up in the office in 3 months.  Patient agreeable to this plan and will call with any questions or concerns.            Follow Up       Follow Up   Return in about 3 months (around 8/8/2023) for  Follow up with .  Patient was given instructions and counseling regarding her condition or for health maintenance advice. Please see specific information pulled into the AVS if appropriate.

## 2023-06-02 RX ORDER — MONTELUKAST SODIUM 4 MG/1
2 TABLET, CHEWABLE ORAL 2 TIMES DAILY
Qty: 120 TABLET | Refills: 2 | Status: SHIPPED | OUTPATIENT
Start: 2023-06-02

## 2023-07-26 ENCOUNTER — TELEPHONE (OUTPATIENT)
Dept: GASTROENTEROLOGY | Facility: CLINIC | Age: 38
End: 2023-07-26
Payer: COMMERCIAL

## 2023-07-26 NOTE — TELEPHONE ENCOUNTER
Spoke to pt on labs that was ordered for her. She states she had them done at [facility]. Will call and request records

## 2023-07-28 RX ORDER — MONTELUKAST SODIUM 4 MG/1
TABLET, CHEWABLE ORAL
Qty: 120 TABLET | Refills: 0 | Status: SHIPPED | OUTPATIENT
Start: 2023-07-28

## 2023-08-30 NOTE — PROGRESS NOTES
Chief Complaint   3m follow up    History of Present Illness       Aye Glez is a 38 y.o. female who presents to [facility] GASTRO[facility]OLOGY  follow up with a history of Crohn's ileitis status post ileal resection in November 2020 at the time of her initial diagnosis.  Surgery was performed by Dr. Garcia in [place].  She had a temporary ileostomy with subsequent reversal in January 2021.    Patient was previously on Pentasa and colestipol we transition to Remicade infusions every 8 weeks back in April 2022.   Patient's last Remicade infusion was August 1st 2023.  Today the patient reports improved bowel movements while on Remicade using colestipol 2 g twice daily.   She continues on colestipol 2 g twice daily, Remicade infusions every 8 weeks, Levsin as needed for cramps, Imodium as needed for diarrhea and probiotics daily.    Patient reports weight loss with effort. Patient denies fever,vomiting, weight loss, night sweats, melena, hematochezia, hematemesis.     Endoscopy: Review of the patient's most recent EGD and colonoscopy performed by Dr. Law on 01.31.2022 patent and then ileocolonic anastomosis characterized by ulceration normal mucosa in the entire colon.  Plan to begin Humira.  Repeat 2 years, January 2024.     Labs 2/13/2023 glucose normal, kidney function normal, ALT 40, B12 1873, folate normal, CRP normal, iron saturation 14, quant TB negative, hep panel negative.  Remicade level 5.3 no antibodies identified.    US abdomen limited on 03.20.2023  Borderline enlarged and mildly echogenic liver diffusely most likely related to fatty   infiltration of the liver.  No biliary dilatation or focal hepatic lesions are seen.    Most recent labs on 02.13.2023 and 06.16.2023  Results       Result Review :   The following data was reviewed by: YNES Torres on 09/01/2023:          Iron Profile No results found for: IRON, TIBC, LABIRON, TRANSFERRIN  Ferritin No results  "found for: FERRITIN  ESR (Sed Rate) No results found for: SEDRATE            Past Medical History       Past Medical History:   Diagnosis Date    Anemia     Crohn's disease     Lactose intolerance     Ulcer     Last colonoscopy       Past Surgical History:   Procedure Laterality Date    ABDOMINAL SURGERY      CHOLECYSTECTOMY      COLON RESECTION  2021    COLONOSCOPY N/A 01/31/2022    Procedure: COLONOSCOPY WITH BX;  Surgeon: Torito Law MD;  Location: [facility] ENDOSCOPY;  Service: Gastroenterology;  Laterality: N/A;  ULCERS AT ANASTOMOSIS SITE, PRIOR SURGERY RIGHT COLON     ENDOSCOPY  2020    ENDOSCOPY N/A 01/31/2022    Procedure: ESOPHAGOGASTRODUODENOSCOPY WITH BX;  Surgeon: Torito Law MD;  Location: [facility] ENDOSCOPY;  Service: Gastroenterology;  Laterality: N/A;  NORMAL EGD    ILEOSTOMY  2020    ILEOSTOMY REVISION  2021    TONSILLECTOMY      TUBAL ABDOMINAL LIGATION      UPPER GASTROINTESTINAL ENDOSCOPY           Current Outpatient Medications:     B-D 3CC LUER-TREMAINE SYR 25GX1\" 25G X 1\" 3 ML misc, USE 1 SYRINGE INTRAMUSCULARLY ONCE EVERY MONTH, Disp: , Rfl:     cholecalciferol (VITAMIN D3) 1.25 MG (71715 UT) capsule, Take 1 capsule by mouth Every 7 (Seven) Days., Disp: , Rfl:     colestipol (COLESTID) 1 g tablet, Take 2 tablets by mouth twice daily, Disp: 120 tablet, Rfl: 0    cyanocobalamin 1000 MCG/ML injection, Inject 1 mcg into the appropriate muscle as directed by prescriber Take As Directed., Disp: , Rfl:     hyoscyamine (LEVSIN) 0.125 MG SL tablet, TAKE ONE TABLET BY MOUTH EVERY 4 HOURS AS NEEDED FOR CRAMPING FOR UP TO 30 DAYS., Disp: 120 tablet, Rfl: 1    ibuprofen (ADVIL,MOTRIN) 600 MG tablet, Take 1 tablet by mouth As Needed for Mild Pain., Disp: , Rfl:     inFLIXimab (REMICADE IV), Infuse  into a venous catheter., Disp: , Rfl:     loperamide (IMODIUM) 2 MG capsule, Take 1 capsule by mouth 4 (Four) Times a Day As Needed for Diarrhea., Disp: 60 capsule, Rfl: 2    loratadine (CLARITIN) " "10 MG tablet, Take 1 tablet by mouth Daily., Disp: , Rfl:     medroxyPROGESTERone (PROVERA) 10 MG tablet, , Disp: , Rfl:     montelukast (SINGULAIR) 10 MG tablet, Take 1 tablet by mouth Daily., Disp: , Rfl:     ondansetron (ZOFRAN) 8 MG tablet, Take 1 tablet by mouth Daily As Needed., Disp: , Rfl:     Probiotic Product (PROBIOTIC-10 ULTIMATE PO), Take  by mouth., Disp: , Rfl:     zolpidem (AMBIEN) 10 MG tablet, Take 1 tablet by mouth every night at bedtime., Disp: , Rfl:     Hydrocort-Pramoxine, Perianal, (ANALPRAM-HC) 2.5-1 % rectal cream, Insert  into the rectum 3 (Three) Times a Day. Apply 1 inch TID for 7 days (Patient not taking: Reported on 9/1/2023), Disp: 4 g, Rfl: 1     Allergies   Allergen Reactions    Iodine GI Intolerance    Sulfa Antibiotics Hives       Family History   Problem Relation Age of Onset    Diabetes Mother     Liver disease Mother     Heart attack Sister 25        PASSED AWAY    Asthma Brother     Diabetes Maternal Grandfather     Hearing loss Maternal Grandfather     Esophageal cancer Maternal Grandfather         Squamous cell carcinoma, focally keratinizing, p16 positive    Colon cancer Neg Hx     Malig Hyperthermia Neg Hx         Social History     Social History Narrative    Not on file       Objective     Vital Signs:   /78 (BP Location: Right arm, Patient Position: Sitting, Cuff Size: Adult)   Pulse 85   Ht 167.6 cm (65.98\")   Wt 99.5 kg (219 lb 6.4 oz)   SpO2 100%   BMI 35.43 kg/mý       Physical Exam  Constitutional:       General: She is not in acute distress.     Appearance: Normal appearance. She is well-developed and normal weight.   Eyes:      Conjunctiva/sclera: Conjunctivae normal.      Pupils: Pupils are equal, round, and reactive to light.      Visual Fields: Right eye visual fields normal and left eye visual fields normal.   Cardiovascular:      Rate and Rhythm: Normal rate and regular rhythm.      Heart sounds: Normal heart sounds.   Pulmonary:      Effort: " Pulmonary effort is normal. No retractions.      Breath sounds: Normal breath sounds and air entry.      Comments: Inspection of chest: normal appearance  Abdominal:      General: Bowel sounds are normal.      Palpations: Abdomen is soft.      Tenderness: There is no abdominal tenderness.      Comments: No appreciable hepatosplenomegaly   Musculoskeletal:      Cervical back: Neck supple.      Right lower leg: No edema.      Left lower leg: No edema.   Lymphadenopathy:      Cervical: No cervical adenopathy.   Skin:     Findings: No lesion.      Comments: Turgor normal   Neurological:      Mental Status: She is alert and oriented to person, place, and time.   Psychiatric:         Mood and Affect: Mood and affect normal.         Assessment & Plan          Assessment and Plan    Diagnoses and all orders for this visit:    1. Crohn's disease of small intestine with intestinal obstruction (Primary)  -     CBC & Differential; Future    2. Medication management    3. Elevated liver enzymes    4. Crohn's disease of small intestine without complication    5. S/P colon resection      38-year-old female presenting the office today follow up with a history of Crohn's ileitis status post ileal resection in November 2020 at the time of her initial diagnosis.  Surgery was performed by Dr. Garcia in [place].  She had a temporary ileostomy with subsequent reversal in January 2021.    Patient was previously on Pentasa and colestipol we transition to Remicade infusions every 8 weeks back in April 2022.   Patient's last Remicade infusion was August 1st 2023.  Overall patient is feeling much better.  I have ordered repeat CBC as the patient is having frequent bruising.  Patient will follow-up in 6 months after colonoscopy in January.  Patient agreeable to this plan will call with any questions or concerns.           Follow Up       Follow Up   Return for Follow up after endoscopy in office.  Patient was given instructions and  counseling regarding her condition or for health maintenance advice. Please see specific information pulled into the AVS if appropriate.

## 2023-09-01 ENCOUNTER — OFFICE VISIT (OUTPATIENT)
Dept: GASTROENTEROLOGY | Facility: CLINIC | Age: 38
End: 2023-09-01
Payer: COMMERCIAL

## 2023-09-01 VITALS
HEART RATE: 85 BPM | BODY MASS INDEX: 35.26 KG/M2 | SYSTOLIC BLOOD PRESSURE: 138 MMHG | WEIGHT: 219.4 LBS | OXYGEN SATURATION: 100 % | HEIGHT: 66 IN | DIASTOLIC BLOOD PRESSURE: 78 MMHG

## 2023-09-01 DIAGNOSIS — K50.00 CROHN'S DISEASE OF SMALL INTESTINE WITHOUT COMPLICATION: ICD-10-CM

## 2023-09-01 DIAGNOSIS — Z90.49 S/P COLON RESECTION: ICD-10-CM

## 2023-09-01 DIAGNOSIS — R74.8 ELEVATED LIVER ENZYMES: ICD-10-CM

## 2023-09-01 DIAGNOSIS — Z79.899 MEDICATION MANAGEMENT: ICD-10-CM

## 2023-09-01 DIAGNOSIS — K50.012 CROHN'S DISEASE OF SMALL INTESTINE WITH INTESTINAL OBSTRUCTION: Primary | ICD-10-CM

## 2023-09-01 NOTE — PATIENT INSTRUCTIONS
Crohn's Disease    Crohn's disease is a long-lasting (chronic) disease that affects the gastrointestinal (GI) tract. Crohn's disease often causes irritation and inflammation in the small intestine and the beginning of the large intestine, but it can affect any part of the GI tract. Crohn's disease is part of a group of illnesses that are known as inflammatory bowel disease (IBD).  Crohn's disease may start slowly and get worse over time. Symptoms may come and go. They may also go away for months or even years at a time (remission).  What are the causes?  The exact cause of this condition is not known. It may involve a response that causes your body's disease-fighting system (immune system) to attack healthy cells and tissues (autoimmune response). Bacteria, genes, and your environment may also play a role.  What increases the risk?  The following factors may make you more likely to develop this condition:  Having a family member who has Crohn's disease, another IBD, or an autoimmune condition.  Using products that contain nicotine or tobacco, such as cigarettes and e-cigarettes.  Being in your 20s.  Having Eastern  ancestry.  What are the signs or symptoms?  The main symptoms of this condition involve your GI tract. These include:  Diarrhea.  Pain or cramping in the abdomen commonly felt in the lower right side of the abdomen.  Frequent watery or bloody stools.  Constipation. This may mean having:  Fewer bowel movements in a week than normal.  Difficulty having a bowel movement.  Stools that are dry, hard, or larger than normal.  Rectal bleeding or pain.  An urgent need to have a bowel movement.  The feeling that you are not finished having a bowel movement.  Other symptoms may include:  Unexplained weight loss.  Tiredness (fatigue).  Fever.  Nausea or appetite loss.  Joint pain.  Vision changes.  Red bumps or sores on the skin.  Sores inside the mouth.  How is this diagnosed?  This condition may be  diagnosed based on:  Your symptoms and medical history.  A physical exam.  Tests, which may include:  Blood tests.  Stool sample tests.  Imaging tests, such as X-rays and CT scans.  Tests to examine the inside of your intestines using a long, flexible tube that has a light and a camera on the end (colonoscopy).  A procedure to remove tissue samples from inside your bowel for testing (biopsy).  You may need to work with a health care provider who specializes in diseases of the digestive tract (gastroenterologist).  How is this treated?  There is no cure for this condition, and it affects each person differently. Treatment can help you manage your symptoms. Your treatment may include:  Medicines. These may be used by themselves or with other treatments (combination therapy). You may be given medicines that help to:  Reduce inflammation.  Control your immune system activity.  Fight infections.  Relieve cramps and prevent diarrhea.  Control your pain.  Surgery. You may need surgery if:  Medicines and other treatments are not working anymore.  You develop complications from severe Crohn's disease.  A section of your intestine becomes so damaged that it needs to be removed.  Lifestyle changes:  Maintaining eating or drinking restrictions.  Reducing or eliminating use of alcohol or nicotine.  Follow these instructions at home:  Medicines  Take over-the-counter and prescription medicines only as told by your health care provider.  If you were prescribed an antibiotic, take it as told by your health care provider. Do not stop taking the antibiotic even if you start to feel better.  Avoid taking ibuprofen or other NSAID medicines if possible. These can make Crohn's disease worse.  Eating and drinking  Talk with your health care provider or a registered dietitian about what diet is best for you.  Drink enough fluid to keep your urine pale yellow.  If you are taking steroids to reduce inflammation, get plenty of calcium in your  diet to help keep your bones healthy. You may also consider taking a calcium supplement with vitamin D.  Keep a food diary to identify foods that make your symptoms better or worse, and avoid foods that cause symptoms.  Follow instructions from your health care provider about eating or drinking restrictions if you have worsening symptoms (flare-up).  If you drink alcohol:  Limit how much you have to:  0-1 drink a day for women who are not pregnant.  0-2 drinks a day for men.  Know how much alcohol is in a drink. In the [place] one drink equals one 12 oz bottle of beer (355 mL), one 5 oz glass of wine (148 mL), or one 1« oz glass of hard liquor (44 mL).  General instructions  Make sure you get all the vaccines that your health care provider recommends, especially pneumonia (pneumococcal) and flu (influenza) vaccines.  Do not use any products that contain nicotine or tobacco. These products include cigarettes, chewing tobacco, and vaping devices, such as e-cigarettes. If you need help quitting, ask your health care provider.  Exercise every day, or as often as told by your health care provider.  Keep all follow-up visits. This is important.  Contact a health care provider if:  You have diarrhea, cramps in your abdomen, and other GI problems that are present almost all the time.  Your symptoms do not improve with treatment, your symptoms get worse, or you develop new symptoms.  You cannot pass stools.  You continue to lose weight.  You develop a rash or sores on your skin.  You develop eye problems.  You have a fever.  Get help right away if:  You have bloody diarrhea.  You have severe pain in your abdomen.  These symptoms may be an emergency. Get help right away. Call 911.  Do not wait to see if the symptoms will go away.  Do not drive yourself to the hospital.  Summary  Crohn's disease affects each person differently. The cause of this condition is not known, but it may involve a response that causes your body's immune  system to attack healthy cells and tissues.  There are multiple treatment options that can help you manage the condition. Talk with your health care provider or a registered dietitian about what diet is best for you.  Make sure you get all the vaccines that your health care provider recommends, especially pneumonia (pneumococcal) and flu (influenza) vaccines.  Get help right away if you have bloody diarrhea or severe pain in your abdomen.  This information is not intended to replace advice given to you by your health care provider. Make sure you discuss any questions you have with your health care provider.  Document Revised: 06/23/2022 Document Reviewed: 06/23/2022  [employer] Patient Education c 2023 [employer]

## 2023-09-11 RX ORDER — MONTELUKAST SODIUM 4 MG/1
2 TABLET, CHEWABLE ORAL 2 TIMES DAILY
Qty: 120 TABLET | Refills: 0 | Status: SHIPPED | OUTPATIENT
Start: 2023-09-11

## 2023-10-02 RX ORDER — MONTELUKAST SODIUM 4 MG/1
TABLET, CHEWABLE ORAL
Qty: 120 TABLET | Refills: 0 | Status: SHIPPED | OUTPATIENT
Start: 2023-10-02

## 2023-10-13 ENCOUNTER — TELEPHONE (OUTPATIENT)
Dept: GASTROENTEROLOGY | Facility: CLINIC | Age: 38
End: 2023-10-13
Payer: COMMERCIAL

## 2023-10-13 NOTE — TELEPHONE ENCOUNTER
Spoke to pt on labs that are over due/ pt would like to have the lab orders mailed to her so she can have them done.

## 2023-10-24 DIAGNOSIS — K50.012 CROHN'S DISEASE OF SMALL INTESTINE WITH INTESTINAL OBSTRUCTION: Primary | ICD-10-CM

## 2023-10-24 DIAGNOSIS — Z79.899 MEDICATION MANAGEMENT: ICD-10-CM

## 2023-10-24 DIAGNOSIS — K50.012 CROHN'S DISEASE OF SMALL INTESTINE WITH INTESTINAL OBSTRUCTION: ICD-10-CM

## 2023-10-24 DIAGNOSIS — R74.8 ELEVATED LIVER ENZYMES: ICD-10-CM

## 2023-11-10 RX ORDER — MONTELUKAST SODIUM 4 MG/1
2 TABLET, CHEWABLE ORAL 2 TIMES DAILY
Qty: 120 TABLET | Refills: 0 | Status: SHIPPED | OUTPATIENT
Start: 2023-11-10

## 2023-12-11 ENCOUNTER — PREP FOR SURGERY (OUTPATIENT)
Dept: OTHER | Facility: HOSPITAL | Age: 38
End: 2023-12-11
Payer: COMMERCIAL

## 2023-12-11 ENCOUNTER — CLINICAL SUPPORT (OUTPATIENT)
Dept: GASTROENTEROLOGY | Facility: CLINIC | Age: 38
End: 2023-12-11
Payer: COMMERCIAL

## 2023-12-11 DIAGNOSIS — Z12.11 ENCOUNTER FOR SCREENING FOR MALIGNANT NEOPLASM OF COLON: ICD-10-CM

## 2023-12-11 DIAGNOSIS — K50.00 CROHN'S DISEASE OF SMALL INTESTINE WITHOUT COMPLICATION: Primary | ICD-10-CM

## 2023-12-11 RX ORDER — SODIUM PICOSULFATE, MAGNESIUM OXIDE, AND ANHYDROUS CITRIC ACID 12; 3.5; 1 G/175ML; G/175ML; MG/175ML
175 LIQUID ORAL TAKE AS DIRECTED
Qty: 350 ML | Refills: 0 | Status: SHIPPED | OUTPATIENT
Start: 2023-12-11

## 2023-12-11 RX ORDER — MONTELUKAST SODIUM 4 MG/1
2 TABLET, CHEWABLE ORAL 2 TIMES DAILY
Qty: 120 TABLET | Refills: 0 | Status: SHIPPED | OUTPATIENT
Start: 2023-12-11

## 2023-12-11 NOTE — PROGRESS NOTES
Aye Glez  1985  38 y.o.    Reason for call: Recall- 2 YR RECALL, CROHNS, LAST COLON 2022- KM  Prep prescribed: Clenpiq  Prep instructions reviewed with patient and sent to patient via [employer] ([employer])  Is the patient currently on any injectable medications for weight loss or diabetes? No  Clearance needed? No  If yes, what clearance is needed? N/A  Clearance has been requested from n/a  The patient has been scheduled for: Colonoscopy  After your procedure, you will be contacted with results. Please confirm the best phone # to reach the patient: [phone]  Family history of colon cancer? No  If yes, indicate relative: N/A  Family History   Problem Relation Age of Onset    Diabetes Mother     Liver disease Mother     Heart attack Sister 25        PASSED AWAY    Asthma Brother     Diabetes Maternal Grandfather     Hearing loss Maternal Grandfather     Esophageal cancer Maternal Grandfather         Squamous cell carcinoma, focally keratinizing, p16 positive    Colon cancer Neg Hx     Malig Hyperthermia Neg Hx      Past Medical History:   Diagnosis Date    Anemia     Crohn's disease     Lactose intolerance     Ulcer     Last colonoscopy     Allergies   Allergen Reactions    Iodine GI Intolerance    Sulfa Antibiotics Hives     Past Surgical History:   Procedure Laterality Date    ABDOMINAL SURGERY      CHOLECYSTECTOMY      COLON RESECTION  2021    COLONOSCOPY N/A 01/31/2022    Procedure: COLONOSCOPY WITH BX;  Surgeon: Torito Law MD;  Location: [facility] ENDOSCOPY;  Service: Gastroenterology;  Laterality: N/A;  ULCERS AT ANASTOMOSIS SITE, PRIOR SURGERY RIGHT COLON     ENDOSCOPY  2020    ENDOSCOPY N/A 01/31/2022    Procedure: ESOPHAGOGASTRODUODENOSCOPY WITH BX;  Surgeon: Torito Law MD;  Location: [facility] ENDOSCOPY;  Service: Gastroenterology;  Laterality: N/A;  NORMAL EGD    ILEOSTOMY  2020    ILEOSTOMY REVISION  2021    TONSILLECTOMY      TUBAL ABDOMINAL LIGATION      UPPER GASTROINTESTINAL  "ENDOSCOPY       Social History     Socioeconomic History    Marital status:    Tobacco Use    Smoking status: Every Day     Types: Cigarettes     Last attempt to quit: 1/1/2020     Years since quitting: 3.9     Passive exposure: Past    Smokeless tobacco: Never    Tobacco comments:     2nd hand smoke exposure//mom   Vaping Use    Vaping Use: Never used   Substance and Sexual Activity    Alcohol use: Yes     Comment: rarely    Drug use: Yes     Types: Marijuana    Sexual activity: Yes     Partners: Male     Birth control/protection: Tubal ligation       Current Outpatient Medications:     B-D 3CC LUER-TREMAINE SYR 25GX1\" 25G X 1\" 3 ML misc, USE 1 SYRINGE INTRAMUSCULARLY ONCE EVERY MONTH, Disp: , Rfl:     cholecalciferol (VITAMIN D3) 1.25 MG (92368 UT) capsule, Take 1 capsule by mouth Every 7 (Seven) Days., Disp: , Rfl:     colestipol (COLESTID) 1 g tablet, Take 2 tablets by mouth twice daily, Disp: 120 tablet, Rfl: 0    cyanocobalamin 1000 MCG/ML injection, Inject 1 mcg into the appropriate muscle as directed by prescriber Take As Directed., Disp: , Rfl:     Hydrocort-Pramoxine, Perianal, (ANALPRAM-HC) 2.5-1 % rectal cream, Insert  into the rectum 3 (Three) Times a Day. Apply 1 inch TID for 7 days, Disp: 4 g, Rfl: 1    hyoscyamine (LEVSIN) 0.125 MG SL tablet, TAKE ONE TABLET BY MOUTH EVERY 4 HOURS AS NEEDED FOR CRAMPING FOR UP TO 30 DAYS., Disp: 120 tablet, Rfl: 1    ibuprofen (ADVIL,MOTRIN) 600 MG tablet, Take 1 tablet by mouth As Needed for Mild Pain., Disp: , Rfl:     inFLIXimab (REMICADE IV), Infuse  into a venous catheter., Disp: , Rfl:     loperamide (IMODIUM) 2 MG capsule, Take 1 capsule by mouth 4 (Four) Times a Day As Needed for Diarrhea., Disp: 60 capsule, Rfl: 2    loratadine (CLARITIN) 10 MG tablet, Take 1 tablet by mouth Daily., Disp: , Rfl:     medroxyPROGESTERone (PROVERA) 10 MG tablet, , Disp: , Rfl:     montelukast (SINGULAIR) 10 MG tablet, Take 1 tablet by mouth Daily., Disp: , Rfl:     " ondansetron (ZOFRAN) 8 MG tablet, Take 1 tablet by mouth Daily As Needed., Disp: , Rfl:     Probiotic Product (PROBIOTIC-10 ULTIMATE PO), Take  by mouth., Disp: , Rfl:     zolpidem (AMBIEN) 10 MG tablet, Take 1 tablet by mouth every night at bedtime., Disp: , Rfl:

## 2023-12-23 DIAGNOSIS — Z90.49 S/P COLON RESECTION: ICD-10-CM

## 2023-12-23 DIAGNOSIS — K50.012 CROHN'S DISEASE OF SMALL INTESTINE WITH INTESTINAL OBSTRUCTION: ICD-10-CM

## 2023-12-23 DIAGNOSIS — Z79.899 MEDICATION MANAGEMENT: ICD-10-CM

## 2023-12-23 DIAGNOSIS — R10.31 RIGHT LOWER QUADRANT ABDOMINAL PAIN: ICD-10-CM

## 2023-12-23 DIAGNOSIS — R11.0 NAUSEA: ICD-10-CM

## 2023-12-23 DIAGNOSIS — R74.8 ELEVATED LIVER ENZYMES: ICD-10-CM

## 2023-12-28 RX ORDER — MONTELUKAST SODIUM 4 MG/1
2 TABLET, CHEWABLE ORAL 2 TIMES DAILY
Qty: 120 TABLET | Refills: 0 | Status: SHIPPED | OUTPATIENT
Start: 2023-12-28

## 2023-12-28 RX ORDER — LOPERAMIDE HYDROCHLORIDE 2 MG/1
CAPSULE ORAL
Qty: 60 CAPSULE | Refills: 0 | Status: SHIPPED | OUTPATIENT
Start: 2023-12-28

## 2024-01-17 DIAGNOSIS — R10.31 RIGHT LOWER QUADRANT ABDOMINAL PAIN: ICD-10-CM

## 2024-01-17 DIAGNOSIS — Z79.899 MEDICATION MANAGEMENT: ICD-10-CM

## 2024-01-17 DIAGNOSIS — R11.0 NAUSEA: ICD-10-CM

## 2024-01-17 DIAGNOSIS — Z90.49 S/P COLON RESECTION: ICD-10-CM

## 2024-01-17 DIAGNOSIS — K50.012 CROHN'S DISEASE OF SMALL INTESTINE WITH INTESTINAL OBSTRUCTION: ICD-10-CM

## 2024-01-17 DIAGNOSIS — R74.8 ELEVATED LIVER ENZYMES: ICD-10-CM

## 2024-01-18 RX ORDER — LOPERAMIDE HYDROCHLORIDE 2 MG/1
2 CAPSULE ORAL 4 TIMES DAILY PRN
Qty: 60 CAPSULE | Refills: 0 | Status: SHIPPED | OUTPATIENT
Start: 2024-01-18

## 2024-02-01 NOTE — PRE-PROCEDURE INSTRUCTIONS
"Instructed on date and arrival time of 0800. Come to entrance \"C\". Must have  over age 18 to drive home.  May have two visitors; however, children under 12 must stay in waiting room.  Discussed clear liquid diet (no red or purple), bowel prep, and NPO.  May take medications as usual except for blood thinners, diabetic medications, and weight loss medications.  Bring list of medications.  Verbalized understanding of instructions given.  Instructed to call for questions or concerns.  Hold phentermine until after procedure.  Ok'd per anesthesia.  "

## 2024-02-06 ENCOUNTER — ANESTHESIA EVENT (OUTPATIENT)
Dept: GASTROENTEROLOGY | Facility: HOSPITAL | Age: 39
End: 2024-02-06
Payer: COMMERCIAL

## 2024-02-06 RX ORDER — MONTELUKAST SODIUM 4 MG/1
2 TABLET, CHEWABLE ORAL 2 TIMES DAILY
Qty: 120 TABLET | Refills: 0 | Status: SHIPPED | OUTPATIENT
Start: 2024-02-06

## 2024-02-06 NOTE — ANESTHESIA PREPROCEDURE EVALUATION
Anesthesia Evaluation     Patient summary reviewed and Nursing notes reviewed   NPO Solid Status: > 8 hours  NPO Liquid Status: > 4 hours           Airway   Mallampati: I  TM distance: >3 FB  Neck ROM: full  No difficulty expected  Dental - normal exam     Pulmonary - normal exam    breath sounds clear to auscultation  (+) a smoker Current, Abstained day of surgery, cigarettes,  Cardiovascular - normal exam  Exercise tolerance: good (4-7 METS)    Rhythm: regular  Rate: normal        Neuro/Psych  GI/Hepatic/Renal/Endo    (+) obesity, PUD    ROS Comment: Crohn's Disease    Musculoskeletal     Abdominal    Substance History   (+) drug use (uses marjuana occassionally)     OB/GYN          Other        ROS/Med Hx Other: S/P tubal ligation    Phentermine last dose 1/31    Hx of colon resection - revision of ileostomy         Phys Exam Other: Toradol 30 mg IV and zofran 4 mg IV given for headache and nausea in preop               Anesthesia Plan    ASA 2     general   total IV anesthesia  (Total IV Anesthesia    Patient understands anesthesia not responsible for dental damage.  )  intravenous induction     Anesthetic plan, risks, benefits, and alternatives have been provided, discussed and informed consent has been obtained with: patient.  Pre-procedure education provided  Plan discussed with CRNA.      CODE STATUS:

## 2024-02-07 ENCOUNTER — TELEPHONE (OUTPATIENT)
Dept: GASTROENTEROLOGY | Facility: CLINIC | Age: 39
End: 2024-02-07
Payer: COMMERCIAL

## 2024-02-07 ENCOUNTER — HOSPITAL ENCOUNTER (OUTPATIENT)
Facility: HOSPITAL | Age: 39
Setting detail: HOSPITAL OUTPATIENT SURGERY
Discharge: HOME | End: 2024-02-07
Attending: INTERNAL MEDICINE
Payer: COMMERCIAL

## 2024-02-07 ENCOUNTER — ANESTHESIA (OUTPATIENT)
Dept: GASTROENTEROLOGY | Facility: HOSPITAL | Age: 39
End: 2024-02-07
Payer: COMMERCIAL

## 2024-02-07 VITALS
OXYGEN SATURATION: 99 % | RESPIRATION RATE: 14 BRPM | HEART RATE: 59 BPM | TEMPERATURE: 96.1 F | SYSTOLIC BLOOD PRESSURE: 125 MMHG | DIASTOLIC BLOOD PRESSURE: 81 MMHG | BODY MASS INDEX: 35.03 KG/M2 | WEIGHT: 216.93 LBS

## 2024-02-07 DIAGNOSIS — Z12.11 ENCOUNTER FOR SCREENING FOR MALIGNANT NEOPLASM OF COLON: ICD-10-CM

## 2024-02-07 DIAGNOSIS — K50.00 CROHN'S DISEASE OF SMALL INTESTINE WITHOUT COMPLICATION: ICD-10-CM

## 2024-02-07 PROCEDURE — 25010000002 ONDANSETRON PER 1 MG

## 2024-02-07 PROCEDURE — 25810000003 LACTATED RINGERS PER 1000 ML

## 2024-02-07 PROCEDURE — 45380 COLONOSCOPY AND BIOPSY: CPT | Performed by: INTERNAL MEDICINE

## 2024-02-07 PROCEDURE — 25010000002 PROPOFOL 10 MG/ML EMULSION: Performed by: NURSE ANESTHETIST, CERTIFIED REGISTERED

## 2024-02-07 PROCEDURE — 88305 TISSUE EXAM BY PATHOLOGIST: CPT | Performed by: INTERNAL MEDICINE

## 2024-02-07 PROCEDURE — 25010000002 KETOROLAC TROMETHAMINE PER 15 MG

## 2024-02-07 RX ORDER — PHENTERMINE HYDROCHLORIDE 30 MG/1
37.5 CAPSULE ORAL EVERY MORNING
COMMUNITY

## 2024-02-07 RX ORDER — ONDANSETRON 2 MG/ML
4 INJECTION INTRAMUSCULAR; INTRAVENOUS ONCE
Status: COMPLETED | OUTPATIENT
Start: 2024-02-07 | End: 2024-02-07

## 2024-02-07 RX ORDER — LIDOCAINE HYDROCHLORIDE 20 MG/ML
INJECTION, SOLUTION EPIDURAL; INFILTRATION; INTRACAUDAL; PERINEURAL AS NEEDED
Status: DISCONTINUED | OUTPATIENT
Start: 2024-02-07 | End: 2024-02-07 | Stop reason: SURG

## 2024-02-07 RX ORDER — KETOROLAC TROMETHAMINE 15 MG/ML
30 INJECTION, SOLUTION INTRAMUSCULAR; INTRAVENOUS ONCE
Status: COMPLETED | OUTPATIENT
Start: 2024-02-07 | End: 2024-02-07

## 2024-02-07 RX ORDER — PROPOFOL 10 MG/ML
VIAL (ML) INTRAVENOUS AS NEEDED
Status: DISCONTINUED | OUTPATIENT
Start: 2024-02-07 | End: 2024-02-07 | Stop reason: SURG

## 2024-02-07 RX ORDER — SODIUM CHLORIDE, SODIUM LACTATE, POTASSIUM CHLORIDE, CALCIUM CHLORIDE 600; 310; 30; 20 MG/100ML; MG/100ML; MG/100ML; MG/100ML
30 INJECTION, SOLUTION INTRAVENOUS CONTINUOUS
Status: DISCONTINUED | OUTPATIENT
Start: 2024-02-07 | End: 2024-02-07 | Stop reason: HOSPADM

## 2024-02-07 RX ADMIN — PROPOFOL 175 MCG/KG/MIN: 10 INJECTION, EMULSION INTRAVENOUS at 10:30

## 2024-02-07 RX ADMIN — PROPOFOL 100 MG: 10 INJECTION, EMULSION INTRAVENOUS at 10:30

## 2024-02-07 RX ADMIN — KETOROLAC TROMETHAMINE 30 MG: 15 INJECTION, SOLUTION INTRAMUSCULAR; INTRAVENOUS at 09:25

## 2024-02-07 RX ADMIN — LIDOCAINE HYDROCHLORIDE 50 MG: 20 INJECTION, SOLUTION EPIDURAL; INFILTRATION; INTRACAUDAL; PERINEURAL at 10:30

## 2024-02-07 RX ADMIN — ONDANSETRON 4 MG: 2 INJECTION INTRAMUSCULAR; INTRAVENOUS at 09:22

## 2024-02-07 RX ADMIN — SODIUM CHLORIDE, POTASSIUM CHLORIDE, SODIUM LACTATE AND CALCIUM CHLORIDE 30 ML/HR: 600; 310; 30; 20 INJECTION, SOLUTION INTRAVENOUS at 09:07

## 2024-02-07 NOTE — H&P
Pre Procedure History & Physical    Chief Complaint:   Crohn's disease    Subjective     HPI:   As above    Past Medical History:   Past Medical History:   Diagnosis Date    Anemia     Crohn's disease     Lactose intolerance     Ulcer     Last colonoscopy       Past Surgical History:  Past Surgical History:   Procedure Laterality Date    ABDOMINAL SURGERY      CHOLECYSTECTOMY      COLON RESECTION  2021    COLONOSCOPY N/A 01/31/2022    Procedure: COLONOSCOPY WITH BX;  Surgeon: Torito Law MD;  Location: [facility] ENDOSCOPY;  Service: Gastroenterology;  Laterality: N/A;  ULCERS AT ANASTOMOSIS SITE, PRIOR SURGERY RIGHT COLON     ENDOSCOPY  2020    ENDOSCOPY N/A 01/31/2022    Procedure: ESOPHAGOGASTRODUODENOSCOPY WITH BX;  Surgeon: Torito Law MD;  Location: [facility] ENDOSCOPY;  Service: Gastroenterology;  Laterality: N/A;  NORMAL EGD    ILEOSTOMY  2020    ILEOSTOMY REVISION  2021    TONSILLECTOMY      TUBAL ABDOMINAL LIGATION      UPPER GASTROINTESTINAL ENDOSCOPY         Family History:  Family History   Problem Relation Age of Onset    Diabetes Mother     Liver disease Mother     Heart attack Sister 25        PASSED AWAY    Asthma Brother     Diabetes Maternal Grandfather     Hearing loss Maternal Grandfather     Esophageal cancer Maternal Grandfather         Squamous cell carcinoma, focally keratinizing, p16 positive    Colon cancer Neg Hx     Malig Hyperthermia Neg Hx        Social History:   reports that she has been smoking cigarettes. She has been smoking an average of .5 packs per day. She has been exposed to tobacco smoke. She has never used smokeless tobacco. She reports current alcohol use. She reports current drug use. Drug: Marijuana.    Medications:   Medications Prior to Admission   Medication Sig Dispense Refill Last Dose    cholecalciferol (VITAMIN D3) 1.25 MG (89694 UT) capsule Take 1 capsule by mouth Every 7 (Seven) Days.   Past Week    colestipol (COLESTID) 1 g tablet Take 2 tablets  "by mouth twice daily 120 tablet 0 Past Week    hyoscyamine (LEVSIN) 0.125 MG SL tablet TAKE ONE TABLET BY MOUTH EVERY 4 HOURS AS NEEDED FOR CRAMPING FOR UP TO 30 DAYS. 120 tablet 1 Past Month    ibuprofen (ADVIL,MOTRIN) 600 MG tablet Take 1 tablet by mouth As Needed for Mild Pain.   Past Week    inFLIXimab (REMICADE IV) Infuse  into a venous catheter.   Past Month    loratadine (CLARITIN) 10 MG tablet Take 1 tablet by mouth Daily.   Past Week    montelukast (SINGULAIR) 10 MG tablet Take 1 tablet by mouth Daily.   Past Week    Probiotic Product (PROBIOTIC-10 ULTIMATE PO) Take  by mouth.   Past Week    zolpidem (AMBIEN) 10 MG tablet Take 1 tablet by mouth every night at bedtime.   Past Week    B-D 3CC LUER-TREMAINE SYR 25GX1\" 25G X 1\" 3 ML misc USE 1 SYRINGE INTRAMUSCULARLY ONCE EVERY MONTH       Hydrocort-Pramoxine, Perianal, (ANALPRAM-HC) 2.5-1 % rectal cream Insert  into the rectum 3 (Three) Times a Day. Apply 1 inch TID for 7 days 4 g 1 More than a month    loperamide (IMODIUM) 2 MG capsule Take 1 capsule by mouth 4 (Four) Times a Day As Needed for Diarrhea. 60 capsule 0 More than a month    phentermine 30 MG capsule Take 37.5 mg by mouth Every Morning.   1/31/2024       Allergies:  Iodine and Sulfa antibiotics        Objective     Blood pressure 125/83, pulse 72, temperature 98.2 °F (36.8 °C), temperature source Temporal, resp. rate 16, weight 98.4 kg (216 lb 14.9 oz), SpO2 97%.    Physical Exam   Constitutional: Pt is oriented to person, place, and time and well-developed, well-nourished, and in no distress.   Mouth/Throat: Oropharynx is clear and moist.   Neck: Normal range of motion.   Cardiovascular: Normal rate, regular rhythm and normal heart sounds.    Pulmonary/Chest: Effort normal and breath sounds normal.   Abdominal: Soft. Nontender  Skin: Skin is warm and dry.   Psychiatric: Mood, memory, affect and judgment normal.     Assessment & Plan     Diagnosis:  Crohn's disease    Anticipated Surgical " Procedure:  colonoscopy    The risks, benefits, and alternatives of this procedure have been discussed with the patient or the responsible party- the patient understands and agrees to proceed.

## 2024-02-07 NOTE — ANESTHESIA POSTPROCEDURE EVALUATION
Patient: Aye Glez    Procedure Summary       Date: 02/07/24 Room / Location: [facility] ENDOSCOPY 2 / [facility]    Anesthesia Start: 1026 Anesthesia Stop: 1050    Procedure: COLONOSCOPY WITH BIOPSIES Diagnosis:       Crohn's disease of small intestine without complication      Encounter for screening for malignant neoplasm of colon      (Crohn's disease of small intestine without complication [K50.00])      (Encounter for screening for malignant neoplasm of colon [Z12.11])    Surgeons: Torito Law MD Provider: Og Orozco CRNA    Anesthesia Type: general ASA Status: 2            Anesthesia Type: general    Vitals  Vitals Value Taken Time   /81 02/07/24 1104   Temp 35.6 °C (96.1 °F) 02/07/24 1048   Pulse 57 02/07/24 1104   Resp 14 02/07/24 1103   SpO2 99 % 02/07/24 1104   Vitals shown include unfiled device data.        Post Anesthesia Care and Evaluation    Patient location during evaluation: bedside  Patient participation: complete - patient participated  Level of consciousness: awake  Pain management: adequate    Airway patency: patent  Anesthetic complications: No anesthetic complications  PONV Status: controlled  Cardiovascular status: acceptable and stable  Respiratory status: acceptable

## 2024-02-09 ENCOUNTER — TELEPHONE (OUTPATIENT)
Dept: GASTROENTEROLOGY | Facility: CLINIC | Age: 39
End: 2024-02-09
Payer: COMMERCIAL

## 2024-02-09 NOTE — TELEPHONE ENCOUNTER
----- Message from YNES Torres sent at 2/8/2024 11:55 AM EST -----  Active inflammation and villous blunting noted at the ileum.  Increase Remicade infusions to every 6-week intervals.  Repeat colonoscopy 2 years for surveillance please place this in recall system.

## 2024-03-12 RX ORDER — MONTELUKAST SODIUM 4 MG/1
2 TABLET, CHEWABLE ORAL 2 TIMES DAILY
Qty: 120 TABLET | Refills: 1 | Status: SHIPPED | OUTPATIENT
Start: 2024-03-12

## 2024-04-10 ENCOUNTER — TELEPHONE (OUTPATIENT)
Dept: GASTROENTEROLOGY | Facility: CLINIC | Age: 39
End: 2024-04-10
Payer: COMMERCIAL

## 2024-04-10 NOTE — TELEPHONE ENCOUNTER
Pt left  stating she is due tomorrow for her infusion..  when to PCP yesterday. Has Strep throat and ear infection. Was given Augmentin 875 mg. Asking if ok to continue with infusion..    [phone]    Thx Axel

## 2024-04-10 NOTE — TELEPHONE ENCOUNTER
Rakesh contacted office stating that patient is scheduled for Remicade infusion on Thursday. She is currently on antibiotics for strep. Please advise.

## 2024-04-29 ENCOUNTER — OFFICE VISIT (OUTPATIENT)
Dept: ORTHOPEDIC SURGERY | Facility: CLINIC | Age: 39
End: 2024-04-29
Payer: COMMERCIAL

## 2024-04-29 VITALS
HEIGHT: 65 IN | DIASTOLIC BLOOD PRESSURE: 88 MMHG | OXYGEN SATURATION: 98 % | BODY MASS INDEX: 35.99 KG/M2 | WEIGHT: 216 LBS | SYSTOLIC BLOOD PRESSURE: 132 MMHG | HEART RATE: 82 BPM

## 2024-04-29 DIAGNOSIS — M25.521 RIGHT ELBOW PAIN: Primary | ICD-10-CM

## 2024-04-29 DIAGNOSIS — Z90.49 S/P COLON RESECTION: ICD-10-CM

## 2024-04-29 DIAGNOSIS — M77.11 RIGHT LATERAL EPICONDYLITIS: ICD-10-CM

## 2024-04-29 DIAGNOSIS — R10.31 RIGHT LOWER QUADRANT ABDOMINAL PAIN: ICD-10-CM

## 2024-04-29 DIAGNOSIS — R20.2 NUMBNESS AND TINGLING IN RIGHT HAND: ICD-10-CM

## 2024-04-29 DIAGNOSIS — R74.8 ELEVATED LIVER ENZYMES: ICD-10-CM

## 2024-04-29 DIAGNOSIS — K50.012 CROHN'S DISEASE OF SMALL INTESTINE WITH INTESTINAL OBSTRUCTION: ICD-10-CM

## 2024-04-29 DIAGNOSIS — R20.0 NUMBNESS AND TINGLING IN RIGHT HAND: ICD-10-CM

## 2024-04-29 DIAGNOSIS — R11.0 NAUSEA: ICD-10-CM

## 2024-04-29 DIAGNOSIS — Z79.899 MEDICATION MANAGEMENT: ICD-10-CM

## 2024-04-29 RX ORDER — LIDOCAINE HYDROCHLORIDE 10 MG/ML
1 INJECTION, SOLUTION INFILTRATION; PERINEURAL
Status: COMPLETED | OUTPATIENT
Start: 2024-04-29 | End: 2024-04-29

## 2024-04-29 RX ORDER — TRIAMCINOLONE ACETONIDE 40 MG/ML
40 INJECTION, SUSPENSION INTRA-ARTICULAR; INTRAMUSCULAR
Status: COMPLETED | OUTPATIENT
Start: 2024-04-29 | End: 2024-04-29

## 2024-04-29 RX ADMIN — TRIAMCINOLONE ACETONIDE 40 MG: 40 INJECTION, SUSPENSION INTRA-ARTICULAR; INTRAMUSCULAR at 13:28

## 2024-04-29 RX ADMIN — LIDOCAINE HYDROCHLORIDE 1 ML: 10 INJECTION, SOLUTION INFILTRATION; PERINEURAL at 13:28

## 2024-04-29 NOTE — PROGRESS NOTES
"Chief Complaint  Initial Evaluation of the Right Elbow     Subjective      Aye Glez presents to [facility] for initial evaluation of the right elbow. She has snapping in the elbow.  She has decrease  and decrease ROM of the elbow.  She has pain on the lateral aspect of the elbow.  She is wearing a brace.  She has numbness and tingling in the hand.  She has tingling in her hand and fingers while driving.     Allergies   Allergen Reactions    Iodine GI Intolerance    Sulfa Antibiotics Hives        Social History     Socioeconomic History    Marital status:    Tobacco Use    Smoking status: Every Day     Current packs/day: 0.50     Average packs/day: 0.5 packs/day for 1 year (0.5 ttl pk-yrs)     Types: Cigarettes     Passive exposure: Past    Smokeless tobacco: Never   Vaping Use    Vaping status: Never Used   Substance and Sexual Activity    Alcohol use: Yes     Comment: rarely    Drug use: Yes     Types: Marijuana    Sexual activity: Yes     Partners: Male     Birth control/protection: Tubal ligation        I reviewed the patient's chief complaint, history of present illness, review of systems, past medical history, surgical history, family history, social history, medications, and allergy list.     Review of Systems     Constitutional: Denies fevers, chills, weight loss  Cardiovascular: Denies chest pain, shortness of breath  Skin: Denies rashes, acute skin changes  Neurologic: Denies headache, loss of consciousness      Vital Signs:   /88   Pulse 82   Ht 165.1 cm (65\")   Wt 98 kg (216 lb)   SpO2 98%   BMI 35.94 kg/m²          Physical Exam  General: Alert. No acute distress    Ortho Exam        RIGHT ELBOW Tender lateral epicondyle. Non-tender medial epicondyle. Non-tender radial head. Sensation to light touch median, radial, ulnar nerve. Positive AIN, PIN, ulnar nerve motor function. Axillary sensation intact. Elbow ROM -6-130. Negative Tinel's at " the elbow. no pain with resisted wrist and long finger extension.  Negative Compression testing/ Positive Tinels.  Positive Waldron's testing. . Full ROM of the hand, fingers, and wrist.     Medium Joint Arthrocentesis: R elbow  Date/Time: 4/29/2024 1:28 PM  Supporting Documentation  Indications: pain   Procedure Details  Location: elbow - R elbow  Needle size: 23 G  Medications administered: 1 mL lidocaine 1 %; 40 mg triamcinolone acetonide 40 MG/ML  Patient tolerance: patient tolerated the procedure well with no immediate complications          Imaging Results (Most Recent)       None             Result Review :             Assessment and Plan     Diagnoses and all orders for this visit:    1. Right elbow pain (Primary)    2. Right lateral epicondylitis    3. Numbness and tingling in right hand        Discussed the treatment plan with the patient.     Discussed the risks and benefits of conservative measures. The patient expressed understanding and wished to proceed with a right elbow steroid injection.      HEP exercises.      EMG of the right upper extremity.        Educated on risk of smoking/nicotine. Discussed options for smoking cessation. and Call or return if worsening symptoms.    Follow Up     After EMG of the right upper extremity.      Patient was given instructions and counseling regarding her condition or for health maintenance advice. Please see specific information pulled into the AVS if appropriate.     Scribed for Rodolfo Lynne MD by Alka Osorio MA.  04/29/24   12:53 EDT    I have personally performed the services described in this document as scribed by the above individual and it is both accurate and complete. Rodolfo Lynne MD 04/29/24

## 2024-04-30 RX ORDER — LOPERAMIDE HYDROCHLORIDE 2 MG/1
CAPSULE ORAL
Qty: 60 CAPSULE | Refills: 0 | Status: SHIPPED | OUTPATIENT
Start: 2024-04-30

## 2024-05-07 ENCOUNTER — TELEPHONE (OUTPATIENT)
Dept: GASTROENTEROLOGY | Facility: CLINIC | Age: 39
End: 2024-05-07
Payer: COMMERCIAL

## 2024-05-10 RX ORDER — MONTELUKAST SODIUM 4 MG/1
2 TABLET, CHEWABLE ORAL 2 TIMES DAILY
Qty: 120 TABLET | Refills: 0 | Status: SHIPPED | OUTPATIENT
Start: 2024-05-10

## 2024-06-15 DIAGNOSIS — Z79.899 MEDICATION MANAGEMENT: ICD-10-CM

## 2024-06-15 DIAGNOSIS — R11.0 NAUSEA: ICD-10-CM

## 2024-06-15 DIAGNOSIS — K50.012 CROHN'S DISEASE OF SMALL INTESTINE WITH INTESTINAL OBSTRUCTION: ICD-10-CM

## 2024-06-15 DIAGNOSIS — Z90.49 S/P COLON RESECTION: ICD-10-CM

## 2024-06-15 DIAGNOSIS — R74.8 ELEVATED LIVER ENZYMES: ICD-10-CM

## 2024-06-15 DIAGNOSIS — R10.31 RIGHT LOWER QUADRANT ABDOMINAL PAIN: ICD-10-CM

## 2024-06-17 RX ORDER — MONTELUKAST SODIUM 4 MG/1
2 TABLET, CHEWABLE ORAL 2 TIMES DAILY
Qty: 120 TABLET | Refills: 0 | Status: SHIPPED | OUTPATIENT
Start: 2024-06-17

## 2024-06-17 RX ORDER — LOPERAMIDE HYDROCHLORIDE 2 MG/1
2 CAPSULE ORAL 4 TIMES DAILY PRN
Qty: 120 CAPSULE | Refills: 2 | Status: SHIPPED | OUTPATIENT
Start: 2024-06-17

## 2024-07-16 RX ORDER — MONTELUKAST SODIUM 4 MG/1
2 TABLET, CHEWABLE ORAL 2 TIMES DAILY
Qty: 120 TABLET | Refills: 0 | Status: SHIPPED | OUTPATIENT
Start: 2024-07-16

## 2024-07-30 RX ORDER — MONTELUKAST SODIUM 4 MG/1
2 TABLET, CHEWABLE ORAL 2 TIMES DAILY
Qty: 120 TABLET | Refills: 0 | OUTPATIENT
Start: 2024-07-30

## 2024-08-26 RX ORDER — MONTELUKAST SODIUM 4 MG/1
2 TABLET, CHEWABLE ORAL 2 TIMES DAILY
Qty: 120 TABLET | Refills: 0 | Status: SHIPPED | OUTPATIENT
Start: 2024-08-26

## 2024-09-04 NOTE — PROGRESS NOTES
Chief Complaint   Follow-up (Heat intolerance in June, since her stomach has been back and forth with issues, but they only lasted for a day or so. )    History of Present Illness       Aye Glez is a 39 y.o. female who presents to [facility]  follow up with a history of Crohn's ileitis status post ileal resection in November 2020 at the time of her initial diagnosis.  Surgery was performed by Dr. Garcia in [place].  She had a temporary ileostomy with subsequent reversal in January 2021.    Patient was previously on Pentasa and colestipol we transition to Remicade infusions every 8 weeks back in April 2022.   Patient's last Remicade infusion was August 1st 2023.  Today the patient reports improved bowel movements while on Remicade using colestipol 2 g twice daily.   She continues on colestipol 2 g twice daily, Remicade infusions every 6 weeks which was increased up to the patient's colonoscopy in February 2024, Levsin as needed for cramps, Imodium as needed for diarrhea and probiotics daily.   Patient reports heat intolerance that occurred in June with some upset stomach following but reports her bowel movements have returned to normal.  Patient has rash on right foot that has been continuous for several months does not itch has been treated like athlete's foot with no relief.  Patient denies fever,vomiting, weight loss, night sweats, melena, hematochezia, hematemesis.    Most recent labs- 1/16/24    Colonoscopy: Review of the patient's most recent colonoscopy performed by Dr. Law on 2/7/24 side-to-side ileocolonic anastomosis in the ascending colon that was patent characterized by erythema and ulceration and slightly improved compared to prior exam.  Anastomosis traversed.  Increase Remicade to every 2 weeks.  Repeat colonoscopy 2 years.    Endoscopy: Review of the patient's most recent EGD and colonoscopy performed by Dr. Law on 01.31.2022 patent and  "then ileocolonic anastomosis characterized by ulceration normal mucosa in the entire colon.  Plan to begin Humira.  Repeat 2 years, January 2024.     Labs 2/13/2023 glucose normal, kidney function normal, ALT 40, B12 1873, folate normal, CRP normal, iron saturation 14, quant TB negative, hep panel negative.  Remicade level 5.3 no antibodies identified.     US abdomen limited on 03.20.2023  Borderline enlarged and mildly echogenic liver diffusely most likely related to fatty   infiltration of the liver.  No biliary dilatation or focal hepatic lesions are seen.    Results       Result Review :   The following data was reviewed by: YNES Torres on 09/06/2024:          Iron Profile No results found for: \"IRON\", \"TIBC\", \"LABIRON\", \"TRANSFERRIN\"  Ferritin No results found for: \"FERRITIN\"            Past Medical History       Past Medical History:   Diagnosis Date    Anemia     Crohn's disease     Lactose intolerance     Ulcer     Last colonoscopy       Past Surgical History:   Procedure Laterality Date    ABDOMINAL SURGERY      CHOLECYSTECTOMY      COLON RESECTION  2021    COLONOSCOPY N/A 01/31/2022    Procedure: COLONOSCOPY WITH BX;  Surgeon: Torito Law MD;  Location: [facility] ENDOSCOPY;  Service: Gastroenterology;  Laterality: N/A;  ULCERS AT ANASTOMOSIS SITE, PRIOR SURGERY RIGHT COLON     COLONOSCOPY N/A 2/7/2024    Procedure: COLONOSCOPY WITH BIOPSIES;  Surgeon: Torito Law MD;  Location: [facility] ENDOSCOPY;  Service: Gastroenterology;  Laterality: N/A;  PREVIOUS SURGERY OF ASCENDING COLON WITH ULCERS AT ANASTAMOSIS    ENDOSCOPY  2020    ENDOSCOPY N/A 01/31/2022    Procedure: ESOPHAGOGASTRODUODENOSCOPY WITH BX;  Surgeon: Torito Law MD;  Location: [facility] ENDOSCOPY;  Service: Gastroenterology;  Laterality: N/A;  NORMAL EGD    ILEOSTOMY  2020    ILEOSTOMY REVISION  2021    TONSILLECTOMY      TUBAL ABDOMINAL LIGATION      UPPER GASTROINTESTINAL ENDOSCOPY           Current Outpatient " "Medications:     B-D 3CC LUER-TREMAINE SYR 25GX1\" 25G X 1\" 3 ML misc, USE 1 SYRINGE INTRAMUSCULARLY ONCE EVERY MONTH, Disp: , Rfl:     cholecalciferol (VITAMIN D3) 1.25 MG (50504 UT) capsule, Take 1 capsule by mouth Every 7 (Seven) Days., Disp: , Rfl:     colestipol (COLESTID) 1 g tablet, Take 2 tablets by mouth twice daily, Disp: 120 tablet, Rfl: 0    Hydrocort-Pramoxine, Perianal, (ANALPRAM-HC) 2.5-1 % rectal cream, Insert  into the rectum 3 (Three) Times a Day. Apply 1 inch TID for 7 days, Disp: 4 g, Rfl: 1    hyoscyamine (LEVSIN) 0.125 MG SL tablet, TAKE ONE TABLET BY MOUTH EVERY 4 HOURS AS NEEDED FOR CRAMPING FOR UP TO 30 DAYS., Disp: 120 tablet, Rfl: 1    ibuprofen (ADVIL,MOTRIN) 600 MG tablet, Take 1 tablet by mouth As Needed for Mild Pain., Disp: , Rfl:     inFLIXimab (REMICADE IV), Infuse  into a venous catheter., Disp: , Rfl:     loperamide (IMODIUM) 2 MG capsule, Take 1 capsule by mouth 4 (Four) Times a Day As Needed for Diarrhea., Disp: 120 capsule, Rfl: 2    loratadine (CLARITIN) 10 MG tablet, Take 1 tablet by mouth Daily., Disp: , Rfl:     montelukast (SINGULAIR) 10 MG tablet, Take 1 tablet by mouth Daily., Disp: , Rfl:     phentermine (ADIPEX-P) 37.5 MG tablet, Take 1 tablet by mouth Every Morning., Disp: , Rfl:     Probiotic Product (PROBIOTIC-10 ULTIMATE PO), Take  by mouth., Disp: , Rfl:     Remicade 100 MG injection, , Disp: , Rfl:     vitamin B-12 (CYANOCOBALAMIN) 1000 MCG tablet, Take 1 tablet by mouth Daily., Disp: , Rfl:     zolpidem (AMBIEN) 10 MG tablet, Take 1 tablet by mouth every night at bedtime., Disp: , Rfl:      Allergies   Allergen Reactions    Iodine GI Intolerance    Sulfa Antibiotics Hives       Family History   Problem Relation Age of Onset    Diabetes Mother     Liver disease Mother     Heart attack Sister 25        PASSED AWAY    Asthma Brother     Diabetes Maternal Grandfather     Hearing loss Maternal Grandfather     Esophageal cancer Maternal Grandfather         Squamous cell " "carcinoma, focally keratinizing, p16 positive    Cancer Maternal Grandfather     Cancer Maternal Grandmother     Diabetes Maternal Grandmother     Colon cancer Neg Hx     Malig Hyperthermia Neg Hx         Social History     Social History Narrative    Not on file       Objective     Vital Signs:   /74 (BP Location: Right arm, Patient Position: Sitting, Cuff Size: Adult)   Pulse 93   Ht 165.1 cm (65\")   Wt 96.5 kg (212 lb 11.2 oz)   SpO2 100%   BMI 35.40 kg/m²       Physical Exam  Constitutional:       Appearance: Normal appearance.   Pulmonary:      Effort: Pulmonary effort is normal.   Neurological:      General: No focal deficit present.      Mental Status: She is alert and oriented to person, place, and time.   Psychiatric:         Mood and Affect: Mood normal.         Behavior: Behavior normal.           Assessment & Plan          Assessment and Plan    Diagnoses and all orders for this visit:    1. Rash of foot (Primary)  -     Ambulatory Referral to Dermatology  -     CBC & Differential; Future  -     Comprehensive Metabolic Panel; Future  -     Vitamin B12 & Folate; Future  -     Iron Profile; Future  -     Sedimentation Rate; Future  -     C-reactive Protein; Future  -     Hepatitis Panel, Acute; Future  -     QuantiFERON TB Gold; Future  -     Infliximab and Anti-Infliximab AB DoseASSURE IFX; Future    2. Crohn's disease of small intestine with intestinal obstruction  -     CBC & Differential; Future  -     Comprehensive Metabolic Panel; Future  -     Vitamin B12 & Folate; Future  -     Iron Profile; Future  -     Sedimentation Rate; Future  -     C-reactive Protein; Future  -     Hepatitis Panel, Acute; Future  -     QuantiFERON TB Gold; Future  -     Infliximab and Anti-Infliximab AB DoseASSURE IFX; Future    3. Medication management  -     CBC & Differential; Future  -     Comprehensive Metabolic Panel; Future  -     Vitamin B12 & Folate; Future  -     Iron Profile; Future  -     Sedimentation " Rate; Future  -     C-reactive Protein; Future  -     Hepatitis Panel, Acute; Future  -     QuantiFERON TB Gold; Future  -     Infliximab and Anti-Infliximab AB DoseASSURE IFX; Future      39-year-old female presenting the office today follow up with a history of Crohn's ileitis status post ileal resection in November 2020 at the time of her initial diagnosis.  Surgery was performed by Dr. Garcia in [place].  She had a temporary ileostomy with subsequent reversal in January 2021.    Patient was previously on Pentasa and colestipol we transition to Remicade infusions every 8 weeks back in April 2022.  Remicade transitioned to every 6 weeks following colonoscopy in February 2024.  Patient had what seemed like a flare back in June following heat intolerance.  We will check drug levels with the following infusion to ensure there no antibodies have developed.  I have ordered repeat labs at this time.  Patient referred to dermatology due to foot rash.  Patient will follow-up in 6 months.  Patient agreeable to this plan will call with any questions or concerns.           Follow Up       Follow Up   Return in about 6 months (around 3/6/2025).  Patient was given instructions and counseling regarding her condition or for health maintenance advice. Please see specific information pulled into the AVS if appropriate.

## 2024-09-06 ENCOUNTER — OFFICE VISIT (OUTPATIENT)
Dept: GASTROENTEROLOGY | Facility: CLINIC | Age: 39
End: 2024-09-06
Payer: COMMERCIAL

## 2024-09-06 VITALS
BODY MASS INDEX: 35.44 KG/M2 | SYSTOLIC BLOOD PRESSURE: 126 MMHG | OXYGEN SATURATION: 100 % | DIASTOLIC BLOOD PRESSURE: 74 MMHG | WEIGHT: 212.7 LBS | HEIGHT: 65 IN | HEART RATE: 93 BPM

## 2024-09-06 DIAGNOSIS — K50.012 CROHN'S DISEASE OF SMALL INTESTINE WITH INTESTINAL OBSTRUCTION: ICD-10-CM

## 2024-09-06 DIAGNOSIS — R21 RASH OF FOOT: Primary | ICD-10-CM

## 2024-09-06 DIAGNOSIS — Z79.899 MEDICATION MANAGEMENT: ICD-10-CM

## 2024-09-06 RX ORDER — PHENTERMINE HYDROCHLORIDE 37.5 MG/1
37.5 TABLET ORAL EVERY MORNING
COMMUNITY
Start: 2024-08-26

## 2024-09-06 RX ORDER — LANOLIN ALCOHOL/MO/W.PET/CERES
1 CREAM (GRAM) TOPICAL DAILY
COMMUNITY
Start: 2024-08-09

## 2024-09-06 RX ORDER — INFLIXIMAB 100 MG/10ML
INJECTION, POWDER, LYOPHILIZED, FOR SOLUTION INTRAVENOUS
COMMUNITY
Start: 2024-08-22

## 2024-09-24 RX ORDER — MONTELUKAST SODIUM 4 MG/1
2 TABLET, CHEWABLE ORAL 2 TIMES DAILY
Qty: 120 TABLET | Refills: 0 | Status: SHIPPED | OUTPATIENT
Start: 2024-09-24

## 2024-10-25 ENCOUNTER — TELEPHONE (OUTPATIENT)
Dept: GASTROENTEROLOGY | Facility: CLINIC | Age: 39
End: 2024-10-25
Payer: COMMERCIAL

## 2024-10-25 DIAGNOSIS — K50.012 CROHN'S DISEASE OF SMALL INTESTINE WITH INTESTINAL OBSTRUCTION: ICD-10-CM

## 2024-10-25 DIAGNOSIS — Z79.899 MEDICATION MANAGEMENT: ICD-10-CM

## 2024-10-25 DIAGNOSIS — R21 RASH OF FOOT: ICD-10-CM

## 2024-10-25 RX ORDER — MONTELUKAST SODIUM 4 MG/1
2 TABLET, CHEWABLE ORAL 2 TIMES DAILY
Qty: 120 TABLET | Refills: 3 | Status: SHIPPED | OUTPATIENT
Start: 2024-10-25

## 2024-10-25 NOTE — TELEPHONE ENCOUNTER
----- Message from Bharati Wheeler sent at 10/25/2024  1:05 PM EDT -----  Glucose normal.  Kidney function normal.  Electrolytes normal.  Liver enzymes mildly elevated.  CRP normal.  Vitamin B12 normal.  Folate normal.  Sed rate normal.  Hemoglobin normal.  TB test negative.  Infliximab levels normal with no antibodies.

## 2024-12-06 DIAGNOSIS — Z90.49 S/P COLON RESECTION: ICD-10-CM

## 2024-12-06 DIAGNOSIS — Z79.899 MEDICATION MANAGEMENT: ICD-10-CM

## 2024-12-06 DIAGNOSIS — R10.31 RIGHT LOWER QUADRANT ABDOMINAL PAIN: ICD-10-CM

## 2024-12-06 DIAGNOSIS — R74.8 ELEVATED LIVER ENZYMES: ICD-10-CM

## 2024-12-06 DIAGNOSIS — R11.0 NAUSEA: ICD-10-CM

## 2024-12-06 DIAGNOSIS — K50.012 CROHN'S DISEASE OF SMALL INTESTINE WITH INTESTINAL OBSTRUCTION: ICD-10-CM

## 2024-12-06 RX ORDER — LOPERAMIDE HYDROCHLORIDE 2 MG/1
2 CAPSULE ORAL 4 TIMES DAILY PRN
Qty: 120 CAPSULE | Refills: 2 | Status: SHIPPED | OUTPATIENT
Start: 2024-12-06

## 2025-03-06 ENCOUNTER — OFFICE VISIT (OUTPATIENT)
Dept: GASTROENTEROLOGY | Facility: CLINIC | Age: 40
End: 2025-03-06
Payer: COMMERCIAL

## 2025-03-06 VITALS
HEIGHT: 65 IN | SYSTOLIC BLOOD PRESSURE: 128 MMHG | OXYGEN SATURATION: 100 % | DIASTOLIC BLOOD PRESSURE: 81 MMHG | HEART RATE: 82 BPM | BODY MASS INDEX: 33.82 KG/M2 | WEIGHT: 203 LBS

## 2025-03-06 DIAGNOSIS — Z90.49 S/P COLON RESECTION: ICD-10-CM

## 2025-03-06 DIAGNOSIS — Z79.899 MEDICATION MANAGEMENT: ICD-10-CM

## 2025-03-06 DIAGNOSIS — R74.8 ELEVATED LIVER ENZYMES: ICD-10-CM

## 2025-03-06 DIAGNOSIS — K50.00 CROHN'S DISEASE OF SMALL INTESTINE WITHOUT COMPLICATION: ICD-10-CM

## 2025-03-06 DIAGNOSIS — R11.0 NAUSEA: ICD-10-CM

## 2025-03-06 DIAGNOSIS — R21 RASH OF FOOT: ICD-10-CM

## 2025-03-06 DIAGNOSIS — K50.012 CROHN'S DISEASE OF SMALL INTESTINE WITH INTESTINAL OBSTRUCTION: Primary | ICD-10-CM

## 2025-03-06 DIAGNOSIS — R10.31 RIGHT LOWER QUADRANT ABDOMINAL PAIN: ICD-10-CM

## 2025-03-06 PROCEDURE — 1160F RVW MEDS BY RX/DR IN RCRD: CPT | Performed by: NURSE PRACTITIONER

## 2025-03-06 PROCEDURE — 99214 OFFICE O/P EST MOD 30 MIN: CPT | Performed by: NURSE PRACTITIONER

## 2025-03-06 PROCEDURE — 1159F MED LIST DOCD IN RCRD: CPT | Performed by: NURSE PRACTITIONER

## 2025-03-06 RX ORDER — HYOSCYAMINE SULFATE 0.12 MG/1
0.12 TABLET SUBLINGUAL EVERY 4 HOURS PRN
Qty: 120 TABLET | Refills: 1 | Status: SHIPPED | OUTPATIENT
Start: 2025-03-06

## 2025-03-06 RX ORDER — LOPERAMIDE HYDROCHLORIDE 2 MG/1
2 CAPSULE ORAL 4 TIMES DAILY PRN
Qty: 120 CAPSULE | Refills: 2 | Status: SHIPPED | OUTPATIENT
Start: 2025-03-06

## 2025-03-06 RX ORDER — TIRZEPATIDE 2.5 MG/.5ML
INJECTION, SOLUTION SUBCUTANEOUS
COMMUNITY

## 2025-03-06 NOTE — PATIENT INSTRUCTIONS
Crohn's Disease    Crohn's disease is a long-lasting (chronic) disease that affects the gastrointestinal (GI) tract. Crohn's disease often causes irritation and inflammation in the small intestine and the beginning of the large intestine, but it can affect any part of the GI tract. Crohn's disease is part of a group of illnesses that are known as inflammatory bowel disease (IBD).  Crohn's disease may start slowly and get worse over time. Symptoms may come and go. They may also go away for months or even years at a time (remission).  What are the causes?  The exact cause of this condition is not known. It may involve a response that causes your body's disease-fighting system (immune system) to attack healthy cells and tissues (autoimmune response). Bacteria, genes, and your environment may also play a role.  What increases the risk?  The following factors may make you more likely to develop this condition:  Having a family member who has Crohn's disease, another IBD, or an autoimmune condition.  Using products that contain nicotine or tobacco, such as cigarettes and e-cigarettes.  Being in your 20s.  Having Eastern  ancestry.  What are the signs or symptoms?  The main symptoms of this condition involve your GI tract. These include:  Diarrhea.  Pain or cramping in the abdomen commonly felt in the lower right side of the abdomen.  Frequent watery or bloody stools.  Constipation. This may mean having:  Fewer bowel movements in a week than normal.  Difficulty having a bowel movement.  Stools that are dry, hard, or larger than normal.  Rectal bleeding or pain.  An urgent need to have a bowel movement.  The feeling that you are not finished having a bowel movement.  Other symptoms may include:  Unexplained weight loss.  Tiredness (fatigue).  Fever.  Nausea or appetite loss.  Joint pain.  Vision changes.  Red bumps or sores on the skin.  Sores inside the mouth.  How is this diagnosed?  This condition may be  diagnosed based on:  Your symptoms and medical history.  A physical exam.  Tests, which may include:  Blood tests.  Stool sample tests.  Imaging tests, such as X-rays and CT scans.  Tests to examine the inside of your intestines using a long, flexible tube that has a light and a camera on the end (colonoscopy).  A procedure to remove tissue samples from inside your bowel for testing (biopsy).  You may need to work with a health care provider who specializes in diseases of the digestive tract (gastroenterologist).  How is this treated?  There is no cure for this condition, and it affects each person differently. Treatment can help you manage your symptoms. Your treatment may include:  Medicines. These may be used by themselves or with other treatments (combination therapy). You may be given medicines that help to:  Reduce inflammation.  Control your immune system activity.  Fight infections.  Relieve cramps and prevent diarrhea.  Control your pain.  Surgery. You may need surgery if:  Medicines and other treatments are not working anymore.  You develop complications from severe Crohn's disease.  A section of your intestine becomes so damaged that it needs to be removed.  Lifestyle changes:  Maintaining eating or drinking restrictions.  Reducing or eliminating use of alcohol or nicotine.  Follow these instructions at home:  Medicines  Take over-the-counter and prescription medicines only as told by your health care provider.  If you were prescribed an antibiotic, take it as told by your health care provider. Do not stop taking the antibiotic even if you start to feel better.  Avoid taking ibuprofen or other NSAID medicines if possible. These can make Crohn's disease worse.  Eating and drinking  Talk with your health care provider or a registered dietitian about what diet is best for you.  Drink enough fluid to keep your urine pale yellow.  If you are taking steroids to reduce inflammation, get plenty of calcium in your  diet to help keep your bones healthy. You may also consider taking a calcium supplement with vitamin D.  Keep a food diary to identify foods that make your symptoms better or worse, and avoid foods that cause symptoms.  Follow instructions from your health care provider about eating or drinking restrictions if you have worsening symptoms (flare-up).  If you drink alcohol:  Limit how much you have to:  0-1 drink a day for women who are not pregnant.  0-2 drinks a day for men.  Know how much alcohol is in a drink. In the [place] one drink equals one 12 oz bottle of beer (355 mL), one 5 oz glass of wine (148 mL), or one 1½ oz glass of hard liquor (44 mL).  General instructions  Make sure you get all the vaccines that your health care provider recommends, especially pneumonia (pneumococcal) and flu (influenza) vaccines.  Do not use any products that contain nicotine or tobacco. These products include cigarettes, chewing tobacco, and vaping devices, such as e-cigarettes. If you need help quitting, ask your health care provider.  Exercise every day, or as often as told by your health care provider.  Keep all follow-up visits. This is important.  Contact a health care provider if:  You have diarrhea, cramps in your abdomen, and other GI problems that are present almost all the time.  Your symptoms do not improve with treatment, your symptoms get worse, or you develop new symptoms.  You cannot pass stools.  You continue to lose weight.  You develop a rash or sores on your skin.  You develop eye problems.  You have a fever.  Get help right away if:  You have bloody diarrhea.  You have severe pain in your abdomen.  These symptoms may be an emergency. Get help right away. Call 911.  Do not wait to see if the symptoms will go away.  Do not drive yourself to the hospital.  Summary  Crohn's disease affects each person differently. The cause of this condition is not known, but it may involve a response that causes your body's immune  system to attack healthy cells and tissues.  There are multiple treatment options that can help you manage the condition. Talk with your health care provider or a registered dietitian about what diet is best for you.  Make sure you get all the vaccines that your health care provider recommends, especially pneumonia (pneumococcal) and flu (influenza) vaccines.  Get help right away if you have bloody diarrhea or severe pain in your abdomen.  This information is not intended to replace advice given to you by your health care provider. Make sure you discuss any questions you have with your health care provider.  Document Revised: 06/23/2022 Document Reviewed: 06/23/2022  [employer] Patient Education © 2024 [employer]

## 2025-03-06 NOTE — PROGRESS NOTES
Chief Complaint   Follow-up and Crohn's disease of small intestine with intestinal obstruct    Patient or patient representative verbalized consent for the use of Ambient Listening during the visit with  YNES Torres for chart documentation. 3/6/2025  11:17 EST      History of Present Illness       Aye Glez is a 40 y.o. female who presents to [facility] for follow-up     History of Present Illness  The patient is a 40-year-old female presenting to the office today for a follow-up with a history of Crohn's disease.    She has a history of Crohn's ileitis and underwent an ileal resection in 11/2020, performed by Dr. Garcia in [place]. A temporary ileostomy was subsequently reversed in 01/2021. She reports overall good health, with no issues related to bowel movements. She does not report any presence of blood or black stools. However, she experienced gastrointestinal discomfort due to antibiotic use. She recalls an incident on [place] when she consumed spicy candy walnuts, which led to severe pain and a brief loss of consciousness. She did not seek emergency care at that time. She continues to take colestipol and Levsin and is requesting refills for these medications as well as Imodium. She is up to date on her colonoscopy, with the last one occurring in 02/2024, displaying the prior side-to-side ileocolonic anastomosis in the ascending colon. She will be due for a repeat colonoscopy in 2026. She was previously on Pentasa and colestipol and transitioned to Remicade infusions every 8 weeks in 04/2022. She continues Remicade every 8 weeks with good control and takes colestipol 2 g twice daily. She uses Levsin and Imodium as needed for diarrhea. She is requesting a refill of Levsin, which will be sent to her pharmacy.    She has experienced significant weight loss since her last visit, attributing it to the use of Mounjaro, which she finds beneficial for  "her stomach. However, she has been unable to obtain this medication due to a change in her doctor's pharmacy. She has gained approximately 6 pounds during her menstrual cycle. Her last laboratory tests were conducted in 10/2023, and she has a lab kit at home for future tests.    MEDICATIONS  Current: Remicade, colestipol, Levsin, Imodium    Most recent labs- 10/11/2024    Colonoscopy: Review of the patient's most recent colonoscopy performed by Dr. Law on 2/7/24    Last office visit- 9/1/23  Aye Glez is a 38 y.o. female who presents to [facility]  follow up with a history of Crohn's ileitis status post ileal resection in November 2020 at the time of her initial diagnosis.  Surgery was performed by Dr. Garcia in [place].  She had a temporary ileostomy with subsequent reversal in January 2021.    Patient was previously on Pentasa and colestipol we transition to Remicade infusions every 8 weeks back in April 2022.   Patient's last Remicade infusion was August 1st 2023.  Today the patient reports improved bowel movements while on Remicade using colestipol 2 g twice daily.   She continues on colestipol 2 g twice daily, Remicade infusions every 8 weeks, Levsin as needed for cramps, Imodium as needed for diarrhea and probiotics daily.    Patient reports weight loss with effort. Patient denies fever,vomiting, weight loss, night sweats, melena, hematochezia, hematemesis.    Colonoscopy 02/07/2024 by Dr. Law     EGD/Colonoscopy 01/31/2022 by Dr. Law     US abdomen limited on 03.20.2023  Borderline enlarged and mildly echogenic liver diffusely most likely related to fatty   infiltration of the liver.  No biliary dilatation or focal hepatic lesions are seen.      Results       Result Review :   The following data was reviewed by: YNES Torres on 03/06/2025:          Iron Profile No results found for: \"IRON\", \"TIBC\", \"LABIRON\", \"TRANSFERRIN\"  Ferritin No " "results found for: \"FERRITIN\"            Results        Past Medical History       Past Medical History:   Diagnosis Date    Anemia     Crohn's disease     Lactose intolerance     Ulcer     Last colonoscopy       Past Surgical History:   Procedure Laterality Date    ABDOMINAL SURGERY      CHOLECYSTECTOMY      COLON RESECTION  2021    COLONOSCOPY N/A 01/31/2022    Procedure: COLONOSCOPY WITH BX;  Surgeon: Torito Law MD;  Location: [facility] ENDOSCOPY;  Service: Gastroenterology;  Laterality: N/A;  ULCERS AT ANASTOMOSIS SITE, PRIOR SURGERY RIGHT COLON     COLONOSCOPY N/A 2/7/2024    Procedure: COLONOSCOPY WITH BIOPSIES;  Surgeon: Torito Law MD;  Location: [facility] ENDOSCOPY;  Service: Gastroenterology;  Laterality: N/A;  PREVIOUS SURGERY OF ASCENDING COLON WITH ULCERS AT ANASTAMOSIS    ENDOSCOPY  2020    ENDOSCOPY N/A 01/31/2022    Procedure: ESOPHAGOGASTRODUODENOSCOPY WITH BX;  Surgeon: Torito Law MD;  Location: [facility] ENDOSCOPY;  Service: Gastroenterology;  Laterality: N/A;  NORMAL EGD    ILEOSTOMY  2020    ILEOSTOMY REVISION  2021    TONSILLECTOMY      TUBAL ABDOMINAL LIGATION      UPPER GASTROINTESTINAL ENDOSCOPY           Current Outpatient Medications:     B-D 3CC LUER-TREMAINE SYR 25GX1\" 25G X 1\" 3 ML misc, USE 1 SYRINGE INTRAMUSCULARLY ONCE EVERY MONTH, Disp: , Rfl:     cholecalciferol (VITAMIN D3) 1.25 MG (34825 UT) capsule, Take 1 capsule by mouth Every 7 (Seven) Days., Disp: , Rfl:     colestipol (COLESTID) 1 g tablet, Take 2 tablets by mouth 2 (Two) Times a Day., Disp: 120 tablet, Rfl: 3    Hydrocort-Pramoxine, Perianal, (ANALPRAM-HC) 2.5-1 % rectal cream, Insert  into the rectum 3 (Three) Times a Day. Apply 1 inch TID for 7 days, Disp: 4 g, Rfl: 1    hyoscyamine (LEVSIN) 0.125 MG SL tablet, Take 1 tablet by mouth Every 4 (Four) Hours As Needed for Cramping., Disp: 120 tablet, Rfl: 1    ibuprofen (ADVIL,MOTRIN) 600 MG tablet, Take 1 tablet by mouth As Needed for Mild Pain., Disp: " ", Rfl:     inFLIXimab (REMICADE IV), Infuse  into a venous catheter., Disp: , Rfl:     loperamide (IMODIUM) 2 MG capsule, Take 1 capsule by mouth 4 (Four) Times a Day As Needed for Diarrhea., Disp: 120 capsule, Rfl: 2    loratadine (CLARITIN) 10 MG tablet, Take 1 tablet by mouth Daily., Disp: , Rfl:     montelukast (SINGULAIR) 10 MG tablet, Take 1 tablet by mouth Daily., Disp: , Rfl:     phentermine (ADIPEX-P) 37.5 MG tablet, Take 1 tablet by mouth Every Morning., Disp: , Rfl:     Probiotic Product (PROBIOTIC-10 ULTIMATE PO), Take  by mouth., Disp: , Rfl:     Remicade 100 MG injection, , Disp: , Rfl:     Tirzepatide (Mounjaro) 2.5 MG/0.5ML solution auto-injector, Inject  under the skin into the appropriate area as directed., Disp: , Rfl:     vitamin B-12 (CYANOCOBALAMIN) 1000 MCG tablet, Take 1 tablet by mouth Daily., Disp: , Rfl:     zolpidem (AMBIEN) 10 MG tablet, Take 1 tablet by mouth every night at bedtime., Disp: , Rfl:      Allergies   Allergen Reactions    Iodine GI Intolerance    Sulfa Antibiotics Hives       Family History   Problem Relation Age of Onset    Diabetes Mother     Liver disease Mother     Heart attack Sister 25        PASSED AWAY    Asthma Brother     Diabetes Maternal Grandfather     Hearing loss Maternal Grandfather     Esophageal cancer Maternal Grandfather         Squamous cell carcinoma, focally keratinizing, p16 positive    Cancer Maternal Grandfather     Cancer Maternal Grandmother     Diabetes Maternal Grandmother     Colon cancer Neg Hx     Malig Hyperthermia Neg Hx         Social History     Social History Narrative    Not on file       Objective       Vital Signs:   /81 (BP Location: Left arm, Patient Position: Sitting, Cuff Size: Adult)   Pulse 82   Ht 165.1 cm (65\")   Wt 92.1 kg (203 lb)   SpO2 100%   BMI 33.78 kg/m²       Physical Exam  Constitutional:       Appearance: Normal appearance.   Pulmonary:      Effort: Pulmonary effort is normal.   Neurological:      " General: No focal deficit present.      Mental Status: She is alert and oriented to person, place, and time.   Psychiatric:         Mood and Affect: Mood normal.         Behavior: Behavior normal.         Physical Exam          Assessment & Plan          Assessment and Plan    Diagnoses and all orders for this visit:    1. Crohn's disease of small intestine with intestinal obstruction (Primary)  -     CBC & Differential; Future  -     Comprehensive Metabolic Panel; Future  -     TSH; Future  -     Lipid Panel; Future  -     Vitamin B12 & Folate; Future  -     Iron Profile; Future  -     QuantiFERON TB Gold; Future  -     Hepatitis Panel, Acute; Future  -     loperamide (IMODIUM) 2 MG capsule; Take 1 capsule by mouth 4 (Four) Times a Day As Needed for Diarrhea.  Dispense: 120 capsule; Refill: 2    2. Nausea  -     CBC & Differential; Future  -     Comprehensive Metabolic Panel; Future  -     TSH; Future  -     Lipid Panel; Future  -     Vitamin B12 & Folate; Future  -     Iron Profile; Future  -     QuantiFERON TB Gold; Future  -     Hepatitis Panel, Acute; Future  -     loperamide (IMODIUM) 2 MG capsule; Take 1 capsule by mouth 4 (Four) Times a Day As Needed for Diarrhea.  Dispense: 120 capsule; Refill: 2    3. Medication management  -     CBC & Differential; Future  -     Comprehensive Metabolic Panel; Future  -     TSH; Future  -     Lipid Panel; Future  -     Vitamin B12 & Folate; Future  -     Iron Profile; Future  -     QuantiFERON TB Gold; Future  -     Hepatitis Panel, Acute; Future  -     loperamide (IMODIUM) 2 MG capsule; Take 1 capsule by mouth 4 (Four) Times a Day As Needed for Diarrhea.  Dispense: 120 capsule; Refill: 2    4. Elevated liver enzymes  -     CBC & Differential; Future  -     Comprehensive Metabolic Panel; Future  -     TSH; Future  -     Lipid Panel; Future  -     Vitamin B12 & Folate; Future  -     Iron Profile; Future  -     QuantiFERON TB Gold; Future  -     Hepatitis Panel, Acute;  Future  -     loperamide (IMODIUM) 2 MG capsule; Take 1 capsule by mouth 4 (Four) Times a Day As Needed for Diarrhea.  Dispense: 120 capsule; Refill: 2    5. Rash of foot  -     CBC & Differential; Future  -     Comprehensive Metabolic Panel; Future  -     TSH; Future  -     Lipid Panel; Future  -     Vitamin B12 & Folate; Future  -     Iron Profile; Future  -     QuantiFERON TB Gold; Future  -     Hepatitis Panel, Acute; Future    6. Crohn's disease of small intestine without complication  -     CBC & Differential; Future  -     Comprehensive Metabolic Panel; Future  -     TSH; Future  -     Lipid Panel; Future  -     Vitamin B12 & Folate; Future  -     Iron Profile; Future  -     QuantiFERON TB Gold; Future  -     Hepatitis Panel, Acute; Future    7. S/P colon resection  -     CBC & Differential; Future  -     Comprehensive Metabolic Panel; Future  -     TSH; Future  -     Lipid Panel; Future  -     Vitamin B12 & Folate; Future  -     Iron Profile; Future  -     QuantiFERON TB Gold; Future  -     Hepatitis Panel, Acute; Future  -     loperamide (IMODIUM) 2 MG capsule; Take 1 capsule by mouth 4 (Four) Times a Day As Needed for Diarrhea.  Dispense: 120 capsule; Refill: 2    8. Right lower quadrant abdominal pain  -     loperamide (IMODIUM) 2 MG capsule; Take 1 capsule by mouth 4 (Four) Times a Day As Needed for Diarrhea.  Dispense: 120 capsule; Refill: 2    Other orders  -     hyoscyamine (LEVSIN) 0.125 MG SL tablet; Take 1 tablet by mouth Every 4 (Four) Hours As Needed for Cramping.  Dispense: 120 tablet; Refill: 1        Assessment & Plan  1. Crohn's Disease.  She has a history of Crohn's ileitis with a status post ileal resection in November 2020 and a temporary ileostomy reversed in January 2021. She is currently on Remicade infusions every 8 weeks and colestipol 2 g twice daily, with good control of her symptoms. She uses Levsin and Imodium as needed for diarrhea. She is up to date on her colonoscopy, with the  last one occurring in February 2024. She will be due for a repeat colonoscopy in 2026. Refills for Levsin, Imodium, and colestipol have been provided and sent to her pharmacy. Laboratory tests have been ordered, including a hepatitis panel and quantification, to be conducted in September or October 2025.    2. Weight management.  She has lost 15 pounds since her last visit in September 2024.    Follow-up  The patient will follow up in 8 months.    PROCEDURE  Ileal resection was performed in 11/2020. Temporary ileostomy was reversed in 01/2021.    Plan to schedule colonoscopy at follow up             Follow Up       Follow Up   Return in about 8 months (around 11/6/2025).  Patient was given instructions and counseling regarding her condition or for health maintenance advice. Please see specific information pulled into the AVS if appropriate.

## 2025-03-17 DIAGNOSIS — K50.012 CROHN'S DISEASE OF SMALL INTESTINE WITH INTESTINAL OBSTRUCTION: Primary | ICD-10-CM

## 2025-03-17 RX ORDER — COLESTIPOL HYDROCHLORIDE 1 G/1
2 TABLET ORAL 2 TIMES DAILY
Qty: 120 TABLET | Refills: 0 | Status: SHIPPED | OUTPATIENT
Start: 2025-03-17

## 2025-04-07 ENCOUNTER — TELEPHONE (OUTPATIENT)
Dept: GASTROENTEROLOGY | Facility: CLINIC | Age: 40
End: 2025-04-07
Payer: COMMERCIAL

## 2025-04-07 NOTE — TELEPHONE ENCOUNTER
"        Hub staff attempted to follow warm transfer process and was unsuccessful     Caller: MARY KAY- [facility]\"    Relationship to patient: Other    Best call back number: [phone]    Patient is needing: MARY KAY IS CALLING AGAIN IN REGARDS TO ORDERS BEING FAXED TO THEM. THEY HAVE BLOOD SAMPLES AND NEEDS ORDERS ASAP. PLEASE FAX THOSE [phone]          "

## 2025-04-07 NOTE — TELEPHONE ENCOUNTER
"      Hub staff attempted to follow warm transfer process and was unsuccessful     Caller: MARY KAY- [facility]\"    Relationship to patient: Other    Best call back number: [phone]    Patient is needing: PT DROPPED OFF SOME SAMPLES TODAY BUT NO ORDERS HAVE BEEN RECEIVED. MARY KAY CALLING TO ASK WHAT  THE SAMPLES ARE FOR AND ASKED THAT ORDERS CAN BE FAXED TO THEM BY 2 PM [phone]          "

## 2025-04-07 NOTE — TELEPHONE ENCOUNTER
Hub staff attempted to follow warm transfer process and was unsuccessful     Caller: MARY KAY     Relationship to patient: [facility]    Best call back number: [phone]    Patient is needing: [facility] STILL NEEDING LAB ORDERS. FAX [phone]

## 2025-04-09 DIAGNOSIS — R74.8 ELEVATED LIVER ENZYMES: ICD-10-CM

## 2025-04-09 DIAGNOSIS — K50.012 CROHN'S DISEASE OF SMALL INTESTINE WITH INTESTINAL OBSTRUCTION: ICD-10-CM

## 2025-04-09 DIAGNOSIS — Z90.49 S/P COLON RESECTION: ICD-10-CM

## 2025-04-09 DIAGNOSIS — Z79.899 MEDICATION MANAGEMENT: ICD-10-CM

## 2025-04-09 DIAGNOSIS — R11.0 NAUSEA: ICD-10-CM

## 2025-04-09 DIAGNOSIS — K50.00 CROHN'S DISEASE OF SMALL INTESTINE WITHOUT COMPLICATION: ICD-10-CM

## 2025-04-09 DIAGNOSIS — R21 RASH OF FOOT: ICD-10-CM

## 2025-04-22 DIAGNOSIS — K50.012 CROHN'S DISEASE OF SMALL INTESTINE WITH INTESTINAL OBSTRUCTION: ICD-10-CM

## 2025-04-22 DIAGNOSIS — R19.7 DIARRHEA, UNSPECIFIED TYPE: Primary | ICD-10-CM

## 2025-04-22 RX ORDER — COLESTIPOL HYDROCHLORIDE 1 G/1
2 TABLET ORAL 2 TIMES DAILY
Qty: 120 TABLET | Refills: 0 | Status: SHIPPED | OUTPATIENT
Start: 2025-04-22

## 2025-04-30 ENCOUNTER — PATIENT MESSAGE (OUTPATIENT)
Dept: GASTROENTEROLOGY | Facility: CLINIC | Age: 40
End: 2025-04-30
Payer: COMMERCIAL

## 2025-05-01 DIAGNOSIS — K50.012 CROHN'S DISEASE OF SMALL INTESTINE WITH INTESTINAL OBSTRUCTION: ICD-10-CM

## 2025-05-01 DIAGNOSIS — R19.7 DIARRHEA, UNSPECIFIED TYPE: ICD-10-CM

## 2025-05-01 RX ORDER — COLESTIPOL HYDROCHLORIDE 1 G/1
2 TABLET ORAL 2 TIMES DAILY
Qty: 120 TABLET | Refills: 0 | Status: SHIPPED | OUTPATIENT
Start: 2025-05-01

## 2025-05-01 NOTE — TELEPHONE ENCOUNTER
Patient has misplaced her refill for colestipol. She is requesting another RX.     Please advise?

## 2025-05-23 DIAGNOSIS — R19.7 DIARRHEA, UNSPECIFIED TYPE: ICD-10-CM

## 2025-05-23 DIAGNOSIS — K50.012 CROHN'S DISEASE OF SMALL INTESTINE WITH INTESTINAL OBSTRUCTION: ICD-10-CM

## 2025-05-27 RX ORDER — COLESTIPOL HYDROCHLORIDE 1 G/1
2 TABLET ORAL 2 TIMES DAILY
Qty: 120 TABLET | Refills: 2 | Status: SHIPPED | OUTPATIENT
Start: 2025-05-27

## 2025-08-22 DIAGNOSIS — R19.7 DIARRHEA, UNSPECIFIED TYPE: ICD-10-CM

## 2025-08-22 DIAGNOSIS — K50.012 CROHN'S DISEASE OF SMALL INTESTINE WITH INTESTINAL OBSTRUCTION: ICD-10-CM

## 2025-08-25 RX ORDER — COLESTIPOL HYDROCHLORIDE 1 G/1
2 TABLET ORAL 2 TIMES DAILY
Qty: 360 TABLET | Refills: 0 | Status: SHIPPED | OUTPATIENT
Start: 2025-08-25

## (undated) DEVICE — LINER SURG CANSTR SXN S/RIGD 1500CC

## (undated) DEVICE — Device: Brand: DEFENDO AIR/WATER/SUCTION AND BIOPSY VALVE

## (undated) DEVICE — SOL IRRG H2O PL/BG 1000ML STRL

## (undated) DEVICE — SINGLE-USE BIOPSY FORCEPS: Brand: RADIAL JAW 4

## (undated) DEVICE — SOLIDIFIER LIQLOC PLS 1500CC BT

## (undated) DEVICE — CONN JET HYDRA H20 AUXILIARY DISP

## (undated) DEVICE — KT VLV BIOP DEFENDO SXN AIR/WATER

## (undated) DEVICE — Device

## (undated) DEVICE — COLON KIT: Brand: MEDLINE INDUSTRIES, INC.

## (undated) DEVICE — EGD OR ERCP KIT: Brand: MEDLINE INDUSTRIES, INC.

## (undated) DEVICE — FRCP BX RADJAW4 LG/CAP 2.8 240CM BX80